# Patient Record
Sex: FEMALE | Race: WHITE | NOT HISPANIC OR LATINO | URBAN - NONMETROPOLITAN AREA
[De-identification: names, ages, dates, MRNs, and addresses within clinical notes are randomized per-mention and may not be internally consistent; named-entity substitution may affect disease eponyms.]

---

## 2017-01-05 ENCOUNTER — AMBULATORY SURGICAL CENTER (OUTPATIENT)
Dept: URBAN - NONMETROPOLITAN AREA SURGERY 2 | Facility: SURGERY | Age: 72
End: 2017-01-05

## 2017-01-05 ENCOUNTER — OFFICE (OUTPATIENT)
Dept: URBAN - NONMETROPOLITAN AREA CLINIC 13 | Facility: CLINIC | Age: 72
End: 2017-01-05

## 2017-01-05 VITALS
SYSTOLIC BLOOD PRESSURE: 150 MMHG | SYSTOLIC BLOOD PRESSURE: 138 MMHG | HEART RATE: 80 BPM | SYSTOLIC BLOOD PRESSURE: 151 MMHG | OXYGEN SATURATION: 99 % | DIASTOLIC BLOOD PRESSURE: 77 MMHG | HEART RATE: 77 BPM | HEART RATE: 85 BPM | OXYGEN SATURATION: 98 % | RESPIRATION RATE: 20 BRPM | OXYGEN SATURATION: 96 % | RESPIRATION RATE: 18 BRPM | DIASTOLIC BLOOD PRESSURE: 85 MMHG | HEART RATE: 79 BPM | HEART RATE: 78 BPM | DIASTOLIC BLOOD PRESSURE: 75 MMHG | WEIGHT: 185 LBS | DIASTOLIC BLOOD PRESSURE: 72 MMHG | HEIGHT: 63 IN | SYSTOLIC BLOOD PRESSURE: 133 MMHG | DIASTOLIC BLOOD PRESSURE: 81 MMHG | HEART RATE: 87 BPM | DIASTOLIC BLOOD PRESSURE: 69 MMHG | SYSTOLIC BLOOD PRESSURE: 145 MMHG | HEART RATE: 86 BPM | SYSTOLIC BLOOD PRESSURE: 134 MMHG

## 2017-01-05 DIAGNOSIS — K31.9 DISEASE OF STOMACH AND DUODENUM, UNSPECIFIED: ICD-10-CM

## 2017-01-05 DIAGNOSIS — K29.70 GASTRITIS, UNSPECIFIED, WITHOUT BLEEDING: ICD-10-CM

## 2017-01-05 DIAGNOSIS — K21.9 GASTRO-ESOPHAGEAL REFLUX DISEASE WITHOUT ESOPHAGITIS: ICD-10-CM

## 2017-01-05 DIAGNOSIS — R13.19 OTHER DYSPHAGIA: ICD-10-CM

## 2017-01-05 DIAGNOSIS — K22.2 ESOPHAGEAL OBSTRUCTION: ICD-10-CM

## 2017-01-05 DIAGNOSIS — R10.13 EPIGASTRIC PAIN: ICD-10-CM

## 2017-01-05 DIAGNOSIS — R11.2 NAUSEA WITH VOMITING, UNSPECIFIED: ICD-10-CM

## 2017-01-05 PROCEDURE — 88312 SPECIAL STAINS GROUP 1: CPT | Mod: TC | Performed by: INTERNAL MEDICINE

## 2017-01-05 PROCEDURE — 00740: CPT | Mod: QS,QZ

## 2017-01-05 PROCEDURE — 88305 TISSUE EXAM BY PATHOLOGIST: CPT | Mod: TC | Performed by: INTERNAL MEDICINE

## 2017-01-05 PROCEDURE — 43248 EGD GUIDE WIRE INSERTION: CPT | Performed by: INTERNAL MEDICINE

## 2017-01-05 PROCEDURE — 43239 EGD BIOPSY SINGLE/MULTIPLE: CPT | Performed by: INTERNAL MEDICINE

## 2017-01-05 PROCEDURE — 00740: CPT | Mod: QZ,QS

## 2017-01-05 RX ORDER — RANITIDINE HYDROCHLORIDE 300 MG/1
TABLET, FILM COATED ORAL
Qty: 180 | Refills: 1 | Status: COMPLETED
End: 2019-01-07

## 2017-01-11 LAB — SURGICAL PROCEDURE: PDF REPORT: (no result)

## 2017-04-05 ENCOUNTER — OFFICE (OUTPATIENT)
Dept: URBAN - NONMETROPOLITAN AREA CLINIC 13 | Facility: CLINIC | Age: 72
End: 2017-04-05

## 2017-04-05 VITALS
DIASTOLIC BLOOD PRESSURE: 78 MMHG | SYSTOLIC BLOOD PRESSURE: 140 MMHG | WEIGHT: 176 LBS | HEIGHT: 63 IN | HEART RATE: 71 BPM

## 2017-04-05 DIAGNOSIS — K21.9 GASTRO-ESOPHAGEAL REFLUX DISEASE WITHOUT ESOPHAGITIS: ICD-10-CM

## 2017-04-05 DIAGNOSIS — K59.00 CONSTIPATION, UNSPECIFIED: ICD-10-CM

## 2017-04-05 DIAGNOSIS — R10.11 RIGHT UPPER QUADRANT PAIN: ICD-10-CM

## 2017-04-05 DIAGNOSIS — R10.13 EPIGASTRIC PAIN: ICD-10-CM

## 2017-04-05 DIAGNOSIS — R10.12 LEFT UPPER QUADRANT PAIN: ICD-10-CM

## 2017-04-05 DIAGNOSIS — K83.8 OTHER SPECIFIED DISEASES OF BILIARY TRACT: ICD-10-CM

## 2017-04-05 DIAGNOSIS — Z86.010 PERSONAL HISTORY OF COLONIC POLYPS: ICD-10-CM

## 2017-04-05 PROCEDURE — 99213 OFFICE O/P EST LOW 20 MIN: CPT

## 2017-10-06 ENCOUNTER — OFFICE (OUTPATIENT)
Dept: URBAN - NONMETROPOLITAN AREA CLINIC 13 | Facility: CLINIC | Age: 72
End: 2017-10-06

## 2017-10-06 VITALS
HEART RATE: 70 BPM | DIASTOLIC BLOOD PRESSURE: 76 MMHG | HEIGHT: 63 IN | WEIGHT: 187 LBS | SYSTOLIC BLOOD PRESSURE: 123 MMHG

## 2017-10-06 DIAGNOSIS — R11.0 NAUSEA: ICD-10-CM

## 2017-10-06 DIAGNOSIS — K21.9 GASTRO-ESOPHAGEAL REFLUX DISEASE WITHOUT ESOPHAGITIS: ICD-10-CM

## 2017-10-06 DIAGNOSIS — R10.13 EPIGASTRIC PAIN: ICD-10-CM

## 2017-10-06 DIAGNOSIS — R10.11 RIGHT UPPER QUADRANT PAIN: ICD-10-CM

## 2017-10-06 DIAGNOSIS — R10.12 LEFT UPPER QUADRANT PAIN: ICD-10-CM

## 2017-10-06 DIAGNOSIS — K59.00 CONSTIPATION, UNSPECIFIED: ICD-10-CM

## 2017-10-06 PROCEDURE — 99213 OFFICE O/P EST LOW 20 MIN: CPT

## 2017-10-06 RX ORDER — RANITIDINE HYDROCHLORIDE 300 MG/1
TABLET, FILM COATED ORAL
Qty: 180 | Refills: 1 | Status: COMPLETED
End: 2019-01-07

## 2017-10-06 RX ORDER — OMEPRAZOLE 20 MG/1
CAPSULE, DELAYED RELEASE ORAL
Qty: 180 | Refills: 1 | Status: ACTIVE

## 2018-04-26 ENCOUNTER — OFFICE (OUTPATIENT)
Dept: URBAN - NONMETROPOLITAN AREA CLINIC 13 | Facility: CLINIC | Age: 73
End: 2018-04-26

## 2018-04-26 VITALS
WEIGHT: 190 LBS | DIASTOLIC BLOOD PRESSURE: 81 MMHG | SYSTOLIC BLOOD PRESSURE: 132 MMHG | HEIGHT: 63 IN | HEART RATE: 77 BPM

## 2018-04-26 VITALS — HEIGHT: 63 IN

## 2018-04-26 DIAGNOSIS — R11.0 NAUSEA: ICD-10-CM

## 2018-04-26 DIAGNOSIS — K22.2 ESOPHAGEAL OBSTRUCTION: ICD-10-CM

## 2018-04-26 DIAGNOSIS — R10.13 EPIGASTRIC PAIN: ICD-10-CM

## 2018-04-26 DIAGNOSIS — K21.9 GASTRO-ESOPHAGEAL REFLUX DISEASE WITHOUT ESOPHAGITIS: ICD-10-CM

## 2018-04-26 DIAGNOSIS — R19.7 DIARRHEA, UNSPECIFIED: ICD-10-CM

## 2018-04-26 LAB
AMYLASE: 49.3 U/L (ref 25–125)
CBC SYSMEX: HEMATOCRIT: 39.1 % (ref 37–47)
CBC SYSMEX: HEMOGLOBIN: 12.6 G/DL (ref 12–14)
CBC SYSMEX: LYMPHS %: 30.6 % (ref 20.5–40.5)
CBC SYSMEX: LYMPHS ABSOLUTE: 2.6 10*3U/L (ref 1.3–2.9)
CBC SYSMEX: MCH: 29.3 PG (ref 27–32)
CBC SYSMEX: MCHC: 32.2 G/DL (ref 28.5–35)
CBC SYSMEX: MCV: 90.9 FL (ref 84–100)
CBC SYSMEX: MONOS %: 11.1 % — HIGH (ref 2–10)
CBC SYSMEX: MONOS ABSOLUTE: 0.9 10*3U/L (ref 0.3–3.8)
CBC SYSMEX: MPV: 9.3 FL (ref 7.4–10.4)
CBC SYSMEX: NEUT. %: 58.3 % (ref 43–65)
CBC SYSMEX: NEUT. ABSOLUTE: 5 10*3U/L — HIGH (ref 2.2–4.8)
CBC SYSMEX: PLATELETS: 314 10*3U/L (ref 130–440)
CBC SYSMEX: RBC: 4.3 10*6U/L (ref 4.2–5.4)
CBC SYSMEX: RDW: 14.1 % (ref 11.5–15.5)
CBC SYSMEX: WBC: 8.5 10*3U/L (ref 4.5–10.5)
COMPLETE METABOLIC: ALBUMIN: 4.3 G/DL (ref 3.5–5.2)
COMPLETE METABOLIC: ALK. PHOS: 116 U/L (ref 40–150)
COMPLETE METABOLIC: ALT: 12 U/L (ref 0–55)
COMPLETE METABOLIC: AST: 17 U/L (ref 5–34)
COMPLETE METABOLIC: BUN: 14 MG/DL (ref 7–26)
COMPLETE METABOLIC: CALCIUM: 9.1 MG/DL (ref 8.4–10.2)
COMPLETE METABOLIC: CHLORIDE: 105 MMOL/L (ref 98–107)
COMPLETE METABOLIC: CO2: 27 MEQ/L (ref 22–29)
COMPLETE METABOLIC: CREATININE: 1.07 MG/DL (ref 0.57–1.25)
COMPLETE METABOLIC: GFR - AFRICAN AMERICAN: 65.3 (ref 59–200)
COMPLETE METABOLIC: GFR - NON AFRICAN AMERICAN: 53.9 — CRITICAL LOW (ref 59–200)
COMPLETE METABOLIC: GLUCOSE: 99 MG/DL (ref 70–99)
COMPLETE METABOLIC: POTASSIUM: 4.3 MMOL/L (ref 3.5–5.3)
COMPLETE METABOLIC: SODIUM: 141 MMOL/L (ref 136–145)
COMPLETE METABOLIC: TOTAL BILIRUBIN: 0.4 MG/DL (ref 0.2–1.2)
COMPLETE METABOLIC: TOTAL PROTEIN: 6.2 G/DL — LOW (ref 6.4–8.3)
LIPASE: 26 U/L (ref 8–78)

## 2018-04-26 PROCEDURE — 99213 OFFICE O/P EST LOW 20 MIN: CPT

## 2018-04-26 PROCEDURE — 36415 COLL VENOUS BLD VENIPUNCTURE: CPT

## 2018-04-26 PROCEDURE — 80053 COMPREHEN METABOLIC PANEL: CPT

## 2018-04-26 PROCEDURE — 85025 COMPLETE CBC W/AUTO DIFF WBC: CPT

## 2018-04-26 PROCEDURE — 82150 ASSAY OF AMYLASE: CPT

## 2018-04-26 PROCEDURE — 83690 ASSAY OF LIPASE: CPT

## 2018-12-04 ENCOUNTER — OFFICE (OUTPATIENT)
Dept: URBAN - NONMETROPOLITAN AREA CLINIC 13 | Facility: CLINIC | Age: 73
End: 2018-12-04

## 2018-12-04 VITALS — HEIGHT: 63 IN

## 2018-12-04 VITALS
SYSTOLIC BLOOD PRESSURE: 135 MMHG | HEART RATE: 80 BPM | OXYGEN SATURATION: 97 % | WEIGHT: 195 LBS | HEIGHT: 63 IN | DIASTOLIC BLOOD PRESSURE: 83 MMHG

## 2018-12-04 DIAGNOSIS — Z86.010 PERSONAL HISTORY OF COLONIC POLYPS: ICD-10-CM

## 2018-12-04 DIAGNOSIS — R10.13 EPIGASTRIC PAIN: ICD-10-CM

## 2018-12-04 DIAGNOSIS — K62.5 HEMORRHAGE OF ANUS AND RECTUM: ICD-10-CM

## 2018-12-04 DIAGNOSIS — Z79.899 OTHER LONG TERM (CURRENT) DRUG THERAPY: ICD-10-CM

## 2018-12-04 DIAGNOSIS — R13.19 OTHER DYSPHAGIA: ICD-10-CM

## 2018-12-04 DIAGNOSIS — K21.9 GASTRO-ESOPHAGEAL REFLUX DISEASE WITHOUT ESOPHAGITIS: ICD-10-CM

## 2018-12-04 LAB
AMYLASE: 52.2 U/L (ref 25–125)
CBC SYSMEX: HEMATOCRIT: 38.3 % (ref 37–47)
CBC SYSMEX: HEMOGLOBIN: 12.3 G/DL (ref 12–14)
CBC SYSMEX: LYMPHS %: 22.5 % (ref 20.5–40.5)
CBC SYSMEX: LYMPHS ABSOLUTE: 1.9 10*3U/L (ref 1.3–2.9)
CBC SYSMEX: MCH: 29 PG (ref 27–32)
CBC SYSMEX: MCHC: 32.1 G/DL (ref 28.5–35)
CBC SYSMEX: MCV: 90.3 FL (ref 84–100)
CBC SYSMEX: MONOS %: 11.2 % — HIGH (ref 2–10)
CBC SYSMEX: MONOS ABSOLUTE: 1 10*3U/L (ref 0.3–3.8)
CBC SYSMEX: MPV: 9.2 FL (ref 8.3–11.9)
CBC SYSMEX: NEUT. %: 66.3 % (ref 43–67)
CBC SYSMEX: NEUT. ABSOLUTE: 5.7 10*3U/L — HIGH (ref 2.2–4.8)
CBC SYSMEX: PLATELETS: 315 10*3U/L (ref 130–440)
CBC SYSMEX: RBC: 4.2 10*6U/L (ref 4.2–5.4)
CBC SYSMEX: RDW: 14.3 % (ref 11.5–15.5)
CBC SYSMEX: WBC: 8.6 10*3U/L (ref 4.5–10.5)
COMPLETE METABOLIC: ALBUMIN: 4.4 G/DL (ref 3.5–5.2)
COMPLETE METABOLIC: ALK. PHOS: 128 U/L (ref 40–150)
COMPLETE METABOLIC: ALT: 14 U/L (ref 0–55)
COMPLETE METABOLIC: AST: 20 U/L (ref 5–34)
COMPLETE METABOLIC: BUN: 14 MG/DL (ref 7–26)
COMPLETE METABOLIC: CALCIUM: 9.6 MG/DL (ref 8.4–10.3)
COMPLETE METABOLIC: CHLORIDE: 104 MMOL/L (ref 98–107)
COMPLETE METABOLIC: CO2: 29 MEQ/L (ref 22–29)
COMPLETE METABOLIC: CREATININE: 1.14 MG/DL (ref 0.57–1.25)
COMPLETE METABOLIC: GFR - AFRICAN AMERICAN: 60.7 (ref 59–200)
COMPLETE METABOLIC: GFR - NON AFRICAN AMERICAN: 50.1 — CRITICAL LOW (ref 59–200)
COMPLETE METABOLIC: GLUCOSE: 95 MG/DL (ref 70–99)
COMPLETE METABOLIC: POTASSIUM: 4.1 MMOL/L (ref 3.5–5.3)
COMPLETE METABOLIC: SODIUM: 141 MMOL/L (ref 136–145)
COMPLETE METABOLIC: TOTAL BILIRUBIN: 0.5 MG/DL (ref 0.2–1.2)
COMPLETE METABOLIC: TOTAL PROTEIN: 6.8 G/DL (ref 6.4–8.3)
LIPASE: 28 U/L (ref 8–78)

## 2018-12-04 PROCEDURE — 80053 COMPREHEN METABOLIC PANEL: CPT

## 2018-12-04 PROCEDURE — 76700 US EXAM ABDOM COMPLETE: CPT | Mod: TC

## 2018-12-04 PROCEDURE — 36415 COLL VENOUS BLD VENIPUNCTURE: CPT | Performed by: INTERNAL MEDICINE

## 2018-12-04 PROCEDURE — 82150 ASSAY OF AMYLASE: CPT

## 2018-12-04 PROCEDURE — 85025 COMPLETE CBC W/AUTO DIFF WBC: CPT

## 2018-12-04 PROCEDURE — 99214 OFFICE O/P EST MOD 30 MIN: CPT | Mod: 25

## 2018-12-04 PROCEDURE — 83690 ASSAY OF LIPASE: CPT

## 2019-01-07 ENCOUNTER — OFFICE (OUTPATIENT)
Dept: URBAN - NONMETROPOLITAN AREA CLINIC 13 | Facility: CLINIC | Age: 74
End: 2019-01-07

## 2019-01-07 ENCOUNTER — AMBULATORY SURGICAL CENTER (OUTPATIENT)
Dept: URBAN - NONMETROPOLITAN AREA SURGERY 2 | Facility: SURGERY | Age: 74
End: 2019-01-07

## 2019-01-07 VITALS
SYSTOLIC BLOOD PRESSURE: 158 MMHG | DIASTOLIC BLOOD PRESSURE: 79 MMHG | DIASTOLIC BLOOD PRESSURE: 96 MMHG | HEART RATE: 72 BPM | SYSTOLIC BLOOD PRESSURE: 142 MMHG | SYSTOLIC BLOOD PRESSURE: 121 MMHG | OXYGEN SATURATION: 98 % | HEART RATE: 67 BPM | HEART RATE: 77 BPM | DIASTOLIC BLOOD PRESSURE: 95 MMHG | SYSTOLIC BLOOD PRESSURE: 137 MMHG | HEART RATE: 78 BPM | DIASTOLIC BLOOD PRESSURE: 77 MMHG | OXYGEN SATURATION: 99 % | HEIGHT: 63 IN | OXYGEN SATURATION: 97 % | SYSTOLIC BLOOD PRESSURE: 145 MMHG | HEART RATE: 81 BPM | DIASTOLIC BLOOD PRESSURE: 74 MMHG | DIASTOLIC BLOOD PRESSURE: 64 MMHG | SYSTOLIC BLOOD PRESSURE: 149 MMHG | OXYGEN SATURATION: 96 % | DIASTOLIC BLOOD PRESSURE: 78 MMHG | RESPIRATION RATE: 18 BRPM | SYSTOLIC BLOOD PRESSURE: 148 MMHG | WEIGHT: 195 LBS | OXYGEN SATURATION: 87 % | RESPIRATION RATE: 20 BRPM | HEART RATE: 75 BPM | HEART RATE: 73 BPM

## 2019-01-07 DIAGNOSIS — K22.2 ESOPHAGEAL OBSTRUCTION: ICD-10-CM

## 2019-01-07 DIAGNOSIS — R13.10 DYSPHAGIA, UNSPECIFIED: ICD-10-CM

## 2019-01-07 DIAGNOSIS — K31.89 OTHER DISEASES OF STOMACH AND DUODENUM: ICD-10-CM

## 2019-01-07 DIAGNOSIS — K29.70 GASTRITIS, UNSPECIFIED, WITHOUT BLEEDING: ICD-10-CM

## 2019-01-07 DIAGNOSIS — K21.9 GASTRO-ESOPHAGEAL REFLUX DISEASE WITHOUT ESOPHAGITIS: ICD-10-CM

## 2019-01-07 PROBLEM — Z79.899 LONG-TERM (CURRENT) USE OF OTHER MEDICATIONS (PPI): Status: ACTIVE | Noted: 2019-01-07

## 2019-01-07 PROCEDURE — 00731 ANES UPR GI NDSC PX NOS: CPT | Mod: QS,QZ

## 2019-01-07 PROCEDURE — 43239 EGD BIOPSY SINGLE/MULTIPLE: CPT | Mod: 59 | Performed by: INTERNAL MEDICINE

## 2019-01-07 PROCEDURE — 88305 TISSUE EXAM BY PATHOLOGIST: CPT | Mod: TC | Performed by: INTERNAL MEDICINE

## 2019-01-07 PROCEDURE — 43248 EGD GUIDE WIRE INSERTION: CPT | Performed by: INTERNAL MEDICINE

## 2019-01-07 RX ORDER — FAMOTIDINE 40 MG/1
TABLET, FILM COATED ORAL
Qty: 180 | Refills: 1 | Status: ACTIVE
Start: 2019-01-07

## 2019-01-07 RX ORDER — RANITIDINE HYDROCHLORIDE 300 MG/1
TABLET, FILM COATED ORAL
Qty: 180 | Refills: 1 | Status: COMPLETED
End: 2019-01-07

## 2019-01-12 LAB — SURGICAL PROCEDURE: PDFREPORT1: (no result)

## 2019-11-05 ENCOUNTER — OFFICE (OUTPATIENT)
Dept: URBAN - NONMETROPOLITAN AREA CLINIC 13 | Facility: CLINIC | Age: 74
End: 2019-11-05

## 2019-11-05 VITALS
SYSTOLIC BLOOD PRESSURE: 116 MMHG | DIASTOLIC BLOOD PRESSURE: 74 MMHG | HEIGHT: 63 IN | HEART RATE: 70 BPM | OXYGEN SATURATION: 94 % | WEIGHT: 171 LBS

## 2019-11-05 DIAGNOSIS — R10.13 EPIGASTRIC PAIN: ICD-10-CM

## 2019-11-05 DIAGNOSIS — K21.9 GASTRO-ESOPHAGEAL REFLUX DISEASE WITHOUT ESOPHAGITIS: ICD-10-CM

## 2019-11-05 DIAGNOSIS — Z86.010 PERSONAL HISTORY OF COLONIC POLYPS: ICD-10-CM

## 2019-11-05 PROCEDURE — 99213 OFFICE O/P EST LOW 20 MIN: CPT

## 2020-03-12 ENCOUNTER — OFFICE (OUTPATIENT)
Dept: URBAN - NONMETROPOLITAN AREA CLINIC 13 | Facility: CLINIC | Age: 75
End: 2020-03-12

## 2020-03-12 VITALS
HEART RATE: 58 BPM | OXYGEN SATURATION: 95 % | DIASTOLIC BLOOD PRESSURE: 80 MMHG | SYSTOLIC BLOOD PRESSURE: 128 MMHG | HEIGHT: 63 IN | WEIGHT: 163 LBS

## 2020-03-12 VITALS — HEIGHT: 63 IN

## 2020-03-12 DIAGNOSIS — K76.89 OTHER SPECIFIED DISEASES OF LIVER: ICD-10-CM

## 2020-03-12 DIAGNOSIS — R10.13 EPIGASTRIC PAIN: ICD-10-CM

## 2020-03-12 DIAGNOSIS — Z86.010 PERSONAL HISTORY OF COLONIC POLYPS: ICD-10-CM

## 2020-03-12 DIAGNOSIS — N28.1 CYST OF KIDNEY, ACQUIRED: ICD-10-CM

## 2020-03-12 DIAGNOSIS — K21.9 GASTRO-ESOPHAGEAL REFLUX DISEASE WITHOUT ESOPHAGITIS: ICD-10-CM

## 2020-03-12 DIAGNOSIS — R10.30 LOWER ABDOMINAL PAIN, UNSPECIFIED: ICD-10-CM

## 2020-03-12 DIAGNOSIS — K22.2 ESOPHAGEAL OBSTRUCTION: ICD-10-CM

## 2020-03-12 PROCEDURE — 99214 OFFICE O/P EST MOD 30 MIN: CPT | Mod: 25

## 2020-03-12 PROCEDURE — 76700 US EXAM ABDOM COMPLETE: CPT | Mod: TC

## 2020-03-12 PROCEDURE — 76856 US EXAM PELVIC COMPLETE: CPT | Mod: TC

## 2020-03-12 RX ORDER — OMEPRAZOLE 20 MG/1
CAPSULE, DELAYED RELEASE ORAL
Qty: 180 | Refills: 1 | Status: ACTIVE

## 2020-03-12 RX ORDER — BACLOFEN 10 MG/1
TABLET ORAL
Qty: 45 | Refills: 5 | Status: ACTIVE
Start: 2019-01-18

## 2020-03-12 RX ORDER — FAMOTIDINE 40 MG/1
TABLET, FILM COATED ORAL
Qty: 180 | Refills: 1 | Status: ACTIVE
Start: 2019-01-07

## 2020-03-13 LAB
AMYLASE: 42 UNITS/L (ref 30–110)
CBC WITH WBC (DIFF): ACANTHOCYTE: NORMAL
CBC WITH WBC (DIFF): AGRANULAR NEUTROPHIL: NORMAL
CBC WITH WBC (DIFF): ANISOCYTOSIS: NORMAL
CBC WITH WBC (DIFF): ATYPICAL LYMPHOCYTE: NORMAL
CBC WITH WBC (DIFF): AUER RODS: NORMAL
CBC WITH WBC (DIFF): BAND: NORMAL
CBC WITH WBC (DIFF): BASOPHIL: 1 % (ref 0–1)
CBC WITH WBC (DIFF): BASOPHILIC STIPPLING: NORMAL
CBC WITH WBC (DIFF): BI-LOBED NEUTROPHIL: NORMAL
CBC WITH WBC (DIFF): BLAST: NORMAL
CBC WITH WBC (DIFF): BURR CELL: NORMAL
CBC WITH WBC (DIFF): DIMORPHIC RBC POPULATION: NORMAL
CBC WITH WBC (DIFF): DOHLE BODIES: NORMAL
CBC WITH WBC (DIFF): ELLIPTOCYTE: NORMAL
CBC WITH WBC (DIFF): EOSINOPHIL: 3 % (ref 0–5)
CBC WITH WBC (DIFF): GIANT PLATELET: NORMAL
CBC WITH WBC (DIFF): HEMATOCRIT: 43.9 % (ref 36–46)
CBC WITH WBC (DIFF): HEMOGLOBIN: 13.1 G/DL (ref 12–16)
CBC WITH WBC (DIFF): HOWELL JOLLY BODIES: NORMAL
CBC WITH WBC (DIFF): HYPERSEGMENTED NEUTROPHIL: NORMAL
CBC WITH WBC (DIFF): HYPOCHROMIA: NORMAL
CBC WITH WBC (DIFF): HYPOGRANULAR NEUTROPHIL: NORMAL
CBC WITH WBC (DIFF): IMMATURE GRANULOCYTES: 0 % (ref 0–1)
CBC WITH WBC (DIFF): LARGE PLATELET: NORMAL
CBC WITH WBC (DIFF): LYMPHOCYTE: 27 % (ref 20–40)
CBC WITH WBC (DIFF): MACROCYTOSIS: NORMAL
CBC WITH WBC (DIFF): MEAN CELL HEMOGLOBIN CONCENTRATION: 29.8 G/DL — LOW (ref 33–37)
CBC WITH WBC (DIFF): MEAN CELL HEMOGLOBIN: 28.4 PG (ref 27–31)
CBC WITH WBC (DIFF): MEAN CELL VOLUME: 95 FL (ref 84–100)
CBC WITH WBC (DIFF): MEAN PLATELET VOLUME: 10.1 FL (ref 8.3–11.9)
CBC WITH WBC (DIFF): METAMYELOCYTE: NORMAL
CBC WITH WBC (DIFF): MICROCYTOSIS: NORMAL
CBC WITH WBC (DIFF): MIX CELLS: NORMAL
CBC WITH WBC (DIFF): MONOCYTE: 9 % (ref 1–10)
CBC WITH WBC (DIFF): MYELOCYTE: NORMAL
CBC WITH WBC (DIFF): NEUTROPHIL SEGMENTED: 61 % (ref 50–70)
CBC WITH WBC (DIFF): NOTE: NORMAL
CBC WITH WBC (DIFF): NUCLEATED RBC: 0 /100WBC (ref 0–0)
CBC WITH WBC (DIFF): OVALOCYTE: NORMAL
CBC WITH WBC (DIFF): PAPPENHEIMER BODIES: NORMAL
CBC WITH WBC (DIFF): PATHOLOGIST INTERPRETATION: NORMAL
CBC WITH WBC (DIFF): PLATELET CLUMPS: NORMAL
CBC WITH WBC (DIFF): PLATELET COUNT: 349 /NL (ref 140–440)
CBC WITH WBC (DIFF): PLT SATELLITOSIS: NORMAL
CBC WITH WBC (DIFF): POIKILOCYTOSIS: NORMAL
CBC WITH WBC (DIFF): POLYCHROMASIA: NORMAL
CBC WITH WBC (DIFF): PROMYELOCYTE: NORMAL
CBC WITH WBC (DIFF): RBC MORPHOLOGY: NORMAL
CBC WITH WBC (DIFF): REACT LYMPH ABS MAN: NORMAL
CBC WITH WBC (DIFF): REACTIVE LYMPHOCYTE: NORMAL
CBC WITH WBC (DIFF): RED BLOOD CELL: 4.62 /PL (ref 4.2–5.4)
CBC WITH WBC (DIFF): RED CELL DIAMETER WIDTH: 52 FL — HIGH (ref 35–48)
CBC WITH WBC (DIFF): ROULEAUX RBC: NORMAL
CBC WITH WBC (DIFF): SCHISTOCYTE: NORMAL
CBC WITH WBC (DIFF): SICKLE CELL: NORMAL
CBC WITH WBC (DIFF): SMUDGE CELLS: NORMAL
CBC WITH WBC (DIFF): SPHEROCYTE: NORMAL
CBC WITH WBC (DIFF): STOMATOCYTE: NORMAL
CBC WITH WBC (DIFF): TARGET CELL: NORMAL
CBC WITH WBC (DIFF): TEAR DROP CELL: NORMAL
CBC WITH WBC (DIFF): TOXIC GRANULATION: NORMAL
CBC WITH WBC (DIFF): UNCLASSIFIED CELL: NORMAL
CBC WITH WBC (DIFF): VACUOLATED NEUTROPHIL: NORMAL
CBC WITH WBC (DIFF): WBC MORPHOLOGY: NORMAL
CBC WITH WBC (DIFF): WHITE BLOOD CELL: 9.4 /NL (ref 4.8–11)
COMPREHENSIVE METABOLIC PANEL: ALBUMIN: 4.3 G/DL (ref 3.5–4.8)
COMPREHENSIVE METABOLIC PANEL: ALKALINE PHOSPHATASE: 84 UNITS/L (ref 36–126)
COMPREHENSIVE METABOLIC PANEL: ALT: 10 UNITS/L (ref ?–34)
COMPREHENSIVE METABOLIC PANEL: ANION GAP: 13 MMOL/L (ref 7–16)
COMPREHENSIVE METABOLIC PANEL: AST: 20 UNITS/L (ref 14–36)
COMPREHENSIVE METABOLIC PANEL: BILIRUBIN, TOTAL: 0.5 MG/DL (ref 0.2–1.3)
COMPREHENSIVE METABOLIC PANEL: BUN/CREATININE RATIO: 14.7
COMPREHENSIVE METABOLIC PANEL: CALCIUM: 9.7 MG/DL (ref 8.4–10.2)
COMPREHENSIVE METABOLIC PANEL: CARBON DIOXIDE: 24 MMOL/L (ref 22–30)
COMPREHENSIVE METABOLIC PANEL: CHLORIDE: 103 MMOL/L (ref 98–107)
COMPREHENSIVE METABOLIC PANEL: CREATININE: 1.02 MG/DL (ref 0.52–1.04)
COMPREHENSIVE METABOLIC PANEL: GLUCOSE: 89 MG/DL (ref 65–105)
COMPREHENSIVE METABOLIC PANEL: POTASSIUM: 4.3 MMOL/L (ref 3.5–5.3)
COMPREHENSIVE METABOLIC PANEL: PROTEIN, TOTAL, SERUM: 7.1 G/DL (ref 6.3–8.2)
COMPREHENSIVE METABOLIC PANEL: SODIUM: 140 MMOL/L (ref 137–145)
COMPREHENSIVE METABOLIC PANEL: UREA NITROGEN (BUN): 15 MG/DL (ref 7–17)
GFR: EGFR AFRICAN AMERICAN: >60 ML/MIN/1.73M2
GFR: EGFR NON-AFR.AMERICAN: 53 ML/MIN/1.73M2 — LOW (ref 60–?)
LIPASE: 47 UNITS/L (ref 23–300)

## 2021-01-01 ENCOUNTER — APPOINTMENT (OUTPATIENT)
Dept: GENERAL RADIOLOGY | Facility: HOSPITAL | Age: 76
End: 2021-01-01

## 2021-01-01 ENCOUNTER — HOSPITAL ENCOUNTER (INPATIENT)
Facility: HOSPITAL | Age: 76
End: 2021-01-01
Attending: INTERNAL MEDICINE | Admitting: INTERNAL MEDICINE

## 2021-01-01 ENCOUNTER — HOSPITAL ENCOUNTER (OUTPATIENT)
Facility: HOSPITAL | Age: 76
End: 2021-07-06
Attending: INTERNAL MEDICINE | Admitting: INTERNAL MEDICINE

## 2021-01-01 VITALS — HEIGHT: 63 IN | WEIGHT: 182.3 LBS | BODY MASS INDEX: 32.3 KG/M2

## 2021-01-01 LAB
ALBUMIN SERPL-MCNC: 2.5 G/DL (ref 3.5–5.2)
ALBUMIN/GLOB SERPL: 0.8 G/DL
ALP SERPL-CCNC: 113 U/L (ref 39–117)
ALT SERPL W P-5'-P-CCNC: 21 U/L (ref 1–33)
ANION GAP SERPL CALCULATED.3IONS-SCNC: 10 MMOL/L (ref 5–15)
ANION GAP SERPL CALCULATED.3IONS-SCNC: 6 MMOL/L (ref 5–15)
ANISOCYTOSIS BLD QL: ABNORMAL
AST SERPL-CCNC: 21 U/L (ref 1–32)
BASO STIPL COARSE BLD QL SMEAR: ABNORMAL
BASOPHILS # BLD MANUAL: 0.19 10*3/MM3 (ref 0–0.2)
BASOPHILS NFR BLD AUTO: 1 % (ref 0–1.5)
BILIRUB SERPL-MCNC: 0.7 MG/DL (ref 0–1.2)
BUN SERPL-MCNC: 26 MG/DL (ref 8–23)
BUN SERPL-MCNC: 28 MG/DL (ref 8–23)
BUN/CREAT SERPL: 36.6 (ref 7–25)
BUN/CREAT SERPL: 50.9 (ref 7–25)
CALCIUM SPEC-SCNC: 8.8 MG/DL (ref 8.6–10.5)
CALCIUM SPEC-SCNC: 9 MG/DL (ref 8.6–10.5)
CHLORIDE SERPL-SCNC: 95 MMOL/L (ref 98–107)
CHLORIDE SERPL-SCNC: 97 MMOL/L (ref 98–107)
CLUMPED PLATELETS: PRESENT
CO2 SERPL-SCNC: 31 MMOL/L (ref 22–29)
CO2 SERPL-SCNC: 32 MMOL/L (ref 22–29)
CREAT SERPL-MCNC: 0.55 MG/DL (ref 0.57–1)
CREAT SERPL-MCNC: 0.71 MG/DL (ref 0.57–1)
DEPRECATED RDW RBC AUTO: 56.1 FL (ref 37–54)
DEPRECATED RDW RBC AUTO: 59.8 FL (ref 37–54)
ELLIPTOCYTES BLD QL SMEAR: ABNORMAL
EOSINOPHIL # BLD MANUAL: 0.46 10*3/MM3 (ref 0–0.4)
EOSINOPHIL # BLD MANUAL: 0.58 10*3/MM3 (ref 0–0.4)
EOSINOPHIL NFR BLD MANUAL: 3 % (ref 0.3–6.2)
EOSINOPHIL NFR BLD MANUAL: 4 % (ref 0.3–6.2)
ERYTHROCYTE [DISTWIDTH] IN BLOOD BY AUTOMATED COUNT: 18.5 % (ref 12.3–15.4)
ERYTHROCYTE [DISTWIDTH] IN BLOOD BY AUTOMATED COUNT: 18.8 % (ref 12.3–15.4)
GFR SERPL CREATININE-BSD FRML MDRD: 108 ML/MIN/1.73
GFR SERPL CREATININE-BSD FRML MDRD: 81 ML/MIN/1.73
GLOBULIN UR ELPH-MCNC: 3 GM/DL
GLUCOSE SERPL-MCNC: 103 MG/DL (ref 65–99)
GLUCOSE SERPL-MCNC: 114 MG/DL (ref 65–99)
HCT VFR BLD AUTO: 30 % (ref 34–46.6)
HCT VFR BLD AUTO: 30.8 % (ref 34–46.6)
HGB BLD-MCNC: 9.9 G/DL (ref 12–15.9)
HGB BLD-MCNC: 9.9 G/DL (ref 12–15.9)
LYMPHOCYTES # BLD MANUAL: 1.26 10*3/MM3 (ref 0.7–3.1)
LYMPHOCYTES # BLD MANUAL: 1.71 10*3/MM3 (ref 0.7–3.1)
LYMPHOCYTES NFR BLD MANUAL: 11.1 % (ref 19.6–45.3)
LYMPHOCYTES NFR BLD MANUAL: 6.1 % (ref 5–12)
LYMPHOCYTES NFR BLD MANUAL: 7.9 % (ref 5–12)
LYMPHOCYTES NFR BLD MANUAL: 8.9 % (ref 19.6–45.3)
MCH RBC QN AUTO: 28.7 PG (ref 26.6–33)
MCH RBC QN AUTO: 28.9 PG (ref 26.6–33)
MCHC RBC AUTO-ENTMCNC: 32.1 G/DL (ref 31.5–35.7)
MCHC RBC AUTO-ENTMCNC: 33 G/DL (ref 31.5–35.7)
MCV RBC AUTO: 87 FL (ref 79–97)
MCV RBC AUTO: 90.1 FL (ref 79–97)
MONOCYTES # BLD AUTO: 0.69 10*3/MM3 (ref 0.1–0.9)
MONOCYTES # BLD AUTO: 1.52 10*3/MM3 (ref 0.1–0.9)
MYELOCYTES NFR BLD MANUAL: 1 % (ref 0–0)
NEUTROPHILS # BLD AUTO: 15.19 10*3/MM3 (ref 1.7–7)
NEUTROPHILS # BLD AUTO: 8.85 10*3/MM3 (ref 1.7–7)
NEUTROPHILS NFR BLD MANUAL: 77.8 % (ref 42.7–76)
NEUTROPHILS NFR BLD MANUAL: 78.2 % (ref 42.7–76)
NEUTS BAND NFR BLD MANUAL: 1 % (ref 0–5)
NRBC BLD AUTO-RTO: 0.5 /100 WBC (ref 0–0.2)
NRBC SPEC MANUAL: 1 /100 WBC (ref 0–0.2)
NRBC SPEC MANUAL: 2 /100 WBC (ref 0–0.2)
NT-PROBNP SERPL-MCNC: 378.7 PG/ML (ref 0–900)
PLAT MORPH BLD: NORMAL
PLATELET # BLD AUTO: 391 10*3/MM3 (ref 140–450)
PLATELET # BLD AUTO: 665 10*3/MM3 (ref 140–450)
PMV BLD AUTO: 10.1 FL (ref 6–12)
PMV BLD AUTO: 10.6 FL (ref 6–12)
POIKILOCYTOSIS BLD QL SMEAR: ABNORMAL
POIKILOCYTOSIS BLD QL SMEAR: ABNORMAL
POLYCHROMASIA BLD QL SMEAR: ABNORMAL
POLYCHROMASIA BLD QL SMEAR: ABNORMAL
POTASSIUM SERPL-SCNC: 3.6 MMOL/L (ref 3.5–5.2)
POTASSIUM SERPL-SCNC: 4.4 MMOL/L (ref 3.5–5.2)
PREALB SERPL-MCNC: 17.1 MG/DL (ref 20–40)
PROT SERPL-MCNC: 5.5 G/DL (ref 6–8.5)
RBC # BLD AUTO: 3.42 10*6/MM3 (ref 3.77–5.28)
RBC # BLD AUTO: 3.45 10*6/MM3 (ref 3.77–5.28)
SMALL PLATELETS BLD QL SMEAR: ABNORMAL
SODIUM SERPL-SCNC: 133 MMOL/L (ref 136–145)
SODIUM SERPL-SCNC: 138 MMOL/L (ref 136–145)
WBC # BLD AUTO: 11.38 10*3/MM3 (ref 3.4–10.8)
WBC # BLD AUTO: 19.18 10*3/MM3 (ref 3.4–10.8)
WBC MORPH BLD: NORMAL
WBC MORPH BLD: NORMAL

## 2021-01-01 PROCEDURE — 63710000001 PREDNISONE PER 1 MG: Performed by: INTERNAL MEDICINE

## 2021-01-01 PROCEDURE — 85007 BL SMEAR W/DIFF WBC COUNT: CPT | Performed by: INTERNAL MEDICINE

## 2021-01-01 PROCEDURE — 71045 X-RAY EXAM CHEST 1 VIEW: CPT

## 2021-01-01 PROCEDURE — 80048 BASIC METABOLIC PNL TOTAL CA: CPT | Performed by: INTERNAL MEDICINE

## 2021-01-01 PROCEDURE — 93010 ELECTROCARDIOGRAM REPORT: CPT | Performed by: INTERNAL MEDICINE

## 2021-01-01 PROCEDURE — 25010000002 FUROSEMIDE PER 20 MG: Performed by: INTERNAL MEDICINE

## 2021-01-01 PROCEDURE — 97530 THERAPEUTIC ACTIVITIES: CPT | Performed by: OCCUPATIONAL THERAPIST

## 2021-01-01 PROCEDURE — 80053 COMPREHEN METABOLIC PANEL: CPT | Performed by: INTERNAL MEDICINE

## 2021-01-01 PROCEDURE — 97165 OT EVAL LOW COMPLEX 30 MIN: CPT | Performed by: OCCUPATIONAL THERAPIST

## 2021-01-01 PROCEDURE — 92526 ORAL FUNCTION THERAPY: CPT

## 2021-01-01 PROCEDURE — 25010000002 EPINEPHRINE 1 MG/10ML SOLUTION PREFILLED SYRINGE

## 2021-01-01 PROCEDURE — 97116 GAIT TRAINING THERAPY: CPT

## 2021-01-01 PROCEDURE — 97110 THERAPEUTIC EXERCISES: CPT

## 2021-01-01 PROCEDURE — 97530 THERAPEUTIC ACTIVITIES: CPT

## 2021-01-01 PROCEDURE — 83880 ASSAY OF NATRIURETIC PEPTIDE: CPT | Performed by: INTERNAL MEDICINE

## 2021-01-01 PROCEDURE — 25010000002 EPINEPHRINE 1 MG/ML SOLUTION 30 ML VIAL: Performed by: INTERNAL MEDICINE

## 2021-01-01 PROCEDURE — 25010000002 FONDAPARINUX PER 0.5 MG: Performed by: INTERNAL MEDICINE

## 2021-01-01 PROCEDURE — 63710000001 PREDNISONE PER 5 MG: Performed by: INTERNAL MEDICINE

## 2021-01-01 PROCEDURE — 93005 ELECTROCARDIOGRAM TRACING: CPT | Performed by: EMERGENCY MEDICINE

## 2021-01-01 PROCEDURE — 99223 1ST HOSP IP/OBS HIGH 75: CPT | Performed by: INTERNAL MEDICINE

## 2021-01-01 PROCEDURE — 85025 COMPLETE CBC W/AUTO DIFF WBC: CPT | Performed by: INTERNAL MEDICINE

## 2021-01-01 PROCEDURE — 25010000003 ATROPINE SULFATE

## 2021-01-01 PROCEDURE — 97161 PT EVAL LOW COMPLEX 20 MIN: CPT | Performed by: PHYSICAL THERAPIST

## 2021-01-01 PROCEDURE — 84134 ASSAY OF PREALBUMIN: CPT | Performed by: INTERNAL MEDICINE

## 2021-01-01 PROCEDURE — 92610 EVALUATE SWALLOWING FUNCTION: CPT

## 2021-01-01 RX ORDER — MORPHINE SULFATE 2 MG/ML
1 INJECTION, SOLUTION INTRAMUSCULAR; INTRAVENOUS EVERY 4 HOURS PRN
Status: DISCONTINUED | OUTPATIENT
Start: 2021-01-01 | End: 2021-07-07 | Stop reason: HOSPADM

## 2021-01-01 RX ORDER — DIAPER,BRIEF,INFANT-TODD,DISP
EACH MISCELLANEOUS DAILY
Status: DISCONTINUED | OUTPATIENT
Start: 2021-01-01 | End: 2021-07-07 | Stop reason: HOSPADM

## 2021-01-01 RX ORDER — ONDANSETRON 2 MG/ML
4 INJECTION INTRAMUSCULAR; INTRAVENOUS EVERY 6 HOURS PRN
Status: DISCONTINUED | OUTPATIENT
Start: 2021-01-01 | End: 2021-01-01

## 2021-01-01 RX ORDER — BISACODYL 10 MG
10 SUPPOSITORY, RECTAL RECTAL DAILY PRN
Status: DISCONTINUED | OUTPATIENT
Start: 2021-01-01 | End: 2021-07-07 | Stop reason: HOSPADM

## 2021-01-01 RX ORDER — ASPIRIN 81 MG/1
81 TABLET, CHEWABLE ORAL DAILY
Status: DISCONTINUED | OUTPATIENT
Start: 2021-01-01 | End: 2021-07-07 | Stop reason: HOSPADM

## 2021-01-01 RX ORDER — SODIUM CHLORIDE 0.9 % (FLUSH) 0.9 %
10 SYRINGE (ML) INJECTION AS NEEDED
Status: DISCONTINUED | OUTPATIENT
Start: 2021-01-01 | End: 2021-07-07 | Stop reason: HOSPADM

## 2021-01-01 RX ORDER — DULOXETIN HYDROCHLORIDE 30 MG/1
30 CAPSULE, DELAYED RELEASE ORAL DAILY
Status: DISCONTINUED | OUTPATIENT
Start: 2021-01-01 | End: 2021-07-07 | Stop reason: HOSPADM

## 2021-01-01 RX ORDER — FUROSEMIDE 10 MG/ML
20 INJECTION INTRAMUSCULAR; INTRAVENOUS
Status: DISCONTINUED | OUTPATIENT
Start: 2021-01-01 | End: 2021-01-01

## 2021-01-01 RX ORDER — PREDNISONE 1 MG/1
5 TABLET ORAL
Status: DISCONTINUED | OUTPATIENT
Start: 2021-07-07 | End: 2021-07-07 | Stop reason: HOSPADM

## 2021-01-01 RX ORDER — FOLIC ACID/VIT B COMPLEX AND C 0.8 MG
1 TABLET ORAL DAILY
Status: DISCONTINUED | OUTPATIENT
Start: 2021-01-01 | End: 2021-07-07 | Stop reason: HOSPADM

## 2021-01-01 RX ORDER — POLYETHYLENE GLYCOL 3350 17 G/17G
17 POWDER, FOR SOLUTION ORAL DAILY
Status: DISCONTINUED | OUTPATIENT
Start: 2021-01-01 | End: 2021-01-01

## 2021-01-01 RX ORDER — DILTIAZEM HYDROCHLORIDE 180 MG/1
180 CAPSULE, COATED, EXTENDED RELEASE ORAL NIGHTLY
Status: DISCONTINUED | OUTPATIENT
Start: 2021-01-01 | End: 2021-07-07 | Stop reason: HOSPADM

## 2021-01-01 RX ORDER — PANTOPRAZOLE SODIUM 20 MG/1
20 TABLET, DELAYED RELEASE ORAL
Status: DISCONTINUED | OUTPATIENT
Start: 2021-01-01 | End: 2021-01-01

## 2021-01-01 RX ORDER — FONDAPARINUX SODIUM 2.5 MG/.5ML
2.5 INJECTION SUBCUTANEOUS
Status: CANCELLED | OUTPATIENT
Start: 2021-01-01

## 2021-01-01 RX ORDER — ACETAMINOPHEN 325 MG/1
650 TABLET ORAL EVERY 6 HOURS PRN
Status: DISCONTINUED | OUTPATIENT
Start: 2021-01-01 | End: 2021-07-07 | Stop reason: HOSPADM

## 2021-01-01 RX ORDER — PANTOPRAZOLE SODIUM 20 MG/1
20 TABLET, DELAYED RELEASE ORAL
Status: DISCONTINUED | OUTPATIENT
Start: 2021-01-01 | End: 2021-07-07 | Stop reason: HOSPADM

## 2021-01-01 RX ORDER — GUAIFENESIN 600 MG/1
600 TABLET, EXTENDED RELEASE ORAL EVERY 12 HOURS SCHEDULED
Status: DISCONTINUED | OUTPATIENT
Start: 2021-01-01 | End: 2021-07-07 | Stop reason: HOSPADM

## 2021-01-01 RX ORDER — VITAMIN E 268 MG
400 CAPSULE ORAL DAILY
Status: DISCONTINUED | OUTPATIENT
Start: 2021-01-01 | End: 2021-07-07 | Stop reason: HOSPADM

## 2021-01-01 RX ORDER — PREDNISONE 20 MG/1
20 TABLET ORAL
Status: DISPENSED | OUTPATIENT
Start: 2021-01-01 | End: 2021-01-01

## 2021-01-01 RX ORDER — FUROSEMIDE 20 MG/1
20 TABLET ORAL
Status: DISCONTINUED | OUTPATIENT
Start: 2021-01-01 | End: 2021-01-01

## 2021-01-01 RX ORDER — FUROSEMIDE 10 MG/ML
20 INJECTION INTRAMUSCULAR; INTRAVENOUS
Status: DISCONTINUED | OUTPATIENT
Start: 2021-01-01 | End: 2021-07-07 | Stop reason: HOSPADM

## 2021-01-01 RX ORDER — ALPRAZOLAM 0.25 MG/1
0.25 TABLET ORAL 2 TIMES DAILY PRN
Status: DISCONTINUED | OUTPATIENT
Start: 2021-01-01 | End: 2021-07-07 | Stop reason: HOSPADM

## 2021-01-01 RX ORDER — SACCHAROMYCES BOULARDII 250 MG
250 CAPSULE ORAL 2 TIMES DAILY
Status: DISCONTINUED | OUTPATIENT
Start: 2021-01-01 | End: 2021-07-07 | Stop reason: HOSPADM

## 2021-01-01 RX ORDER — SODIUM CHLORIDE 0.9 % (FLUSH) 0.9 %
10 SYRINGE (ML) INJECTION EVERY 12 HOURS
Status: DISCONTINUED | OUTPATIENT
Start: 2021-01-01 | End: 2021-07-07 | Stop reason: HOSPADM

## 2021-01-01 RX ORDER — ONDANSETRON 2 MG/ML
4 INJECTION INTRAMUSCULAR; INTRAVENOUS EVERY 6 HOURS PRN
Status: DISCONTINUED | OUTPATIENT
Start: 2021-01-01 | End: 2021-07-07 | Stop reason: HOSPADM

## 2021-01-01 RX ORDER — IPRATROPIUM BROMIDE AND ALBUTEROL SULFATE 2.5; .5 MG/3ML; MG/3ML
3 SOLUTION RESPIRATORY (INHALATION)
Status: DISCONTINUED | OUTPATIENT
Start: 2021-01-01 | End: 2021-07-07 | Stop reason: HOSPADM

## 2021-01-01 RX ORDER — PANTOPRAZOLE SODIUM 40 MG/1
40 TABLET, DELAYED RELEASE ORAL
Status: DISCONTINUED | OUTPATIENT
Start: 2021-01-01 | End: 2021-01-01

## 2021-01-01 RX ORDER — MORPHINE SULFATE 2 MG/ML
1 INJECTION, SOLUTION INTRAMUSCULAR; INTRAVENOUS EVERY 4 HOURS PRN
Status: DISCONTINUED | OUTPATIENT
Start: 2021-01-01 | End: 2021-01-01

## 2021-01-01 RX ORDER — POTASSIUM CHLORIDE 750 MG/1
10 CAPSULE, EXTENDED RELEASE ORAL DAILY
Status: DISCONTINUED | OUTPATIENT
Start: 2021-01-01 | End: 2021-07-07 | Stop reason: HOSPADM

## 2021-01-01 RX ORDER — ACETAMINOPHEN 325 MG/1
650 TABLET ORAL EVERY 6 HOURS PRN
Status: DISCONTINUED | OUTPATIENT
Start: 2021-01-01 | End: 2021-01-01

## 2021-01-01 RX ORDER — POLYETHYLENE GLYCOL 3350 17 G/17G
17 POWDER, FOR SOLUTION ORAL DAILY
Status: DISCONTINUED | OUTPATIENT
Start: 2021-01-01 | End: 2021-07-07 | Stop reason: HOSPADM

## 2021-01-01 RX ORDER — PREDNISONE 10 MG/1
10 TABLET ORAL
Status: DISCONTINUED | OUTPATIENT
Start: 2021-01-01 | End: 2021-07-07 | Stop reason: HOSPADM

## 2021-01-01 RX ORDER — BISACODYL 10 MG
10 SUPPOSITORY, RECTAL RECTAL DAILY PRN
Status: DISCONTINUED | OUTPATIENT
Start: 2021-01-01 | End: 2021-01-01

## 2021-01-01 RX ORDER — FONDAPARINUX SODIUM 2.5 MG/.5ML
2.5 INJECTION SUBCUTANEOUS
Status: DISCONTINUED | OUTPATIENT
Start: 2021-01-01 | End: 2021-07-07 | Stop reason: HOSPADM

## 2021-07-02 NOTE — H&P
Misael Douglas M.D.  GUILLERMO Melendez          Internal Medicine History and Physical      Name: Radha Varela  MRN: [id number]     Acct: [id number]  Room: [facility]/    Admit Date: 7/1/2021  PCP: Eduardo Saba MD    Chief Complaint:     Shortness of breath, weakness    History Obtained From:     chart review and the patient    History of Present Illness:      Radha Varela is a  75 y.o.  female who presents with need for continued oxygen weaning and rehabilitation following recent acute care stay. The patient had been in her usual state of health when she developed increased shortness of breath with fever and chills as well as diarrhea. When her symptoms persisted over about 5 days, she presented to the ER for evaluation and treatment. On intake, she was noted to be positive for COVID-19 as well as C. Diff. The patient declined convalescent plasma, but completed treatment with Remdesivir and IV steroids. Unfortunately, she had a continued decline from a respiratory standpoint. Initially, she was maintaining adequate 02 sats with 02 at 2 lpm. She required 100% NRB and repeat imaging showed worsening of disease. ID was consulted at that point and the patient was treated with Iv antibiotics for possible superimposed bacterial infection. [facility] when she developed fever and increased oxygen demand. The patient declined intubation/mechanical ventilation and changed code status to DNR/DNI. Code status was later changed back to full code. The patient had some mild improvement from respiratory standpoint and has tolerated heated hi flow oxygen. Due to her need for continued oxygen weaning and rehabilitation efforts, she transferred to our facility. She is maintaining adequate 02 sats and appears in no acute distress with 02 at 30 lpm/70%.     Past Medical History:     Atrial  fibrillation  CAD  Emphysema  GERD  ASCUS  HLD  Mitral valve prolapse    Past Surgical History:     Bilateral cataract extraction  Cholecystectomy  Tubal ligation    Medications Prior to Admission:       See chart    Allergies:       Benadryl allergy childrens [diphenhydramine hcl], Celexa [citalopram], Codeine, Floxin otic [ofloxacin], Lortab [hydrocodone-acetaminophen], and Sulfa antibiotics    Social History:     Tobacco:    has no history on file for tobacco use.  Alcohol:      has no history on file for alcohol use.  Drug Use:  has no history on file for drug use.    Family History:     No family history on file.    Review of Systems:     Review of Systems   Constitutional: Positive for malaise/fatigue. Negative for chills, decreased appetite, weight gain and weight loss.   HENT: Negative for congestion, ear discharge, hoarse voice and tinnitus.    Eyes: Negative for blurred vision, discharge, visual disturbance and visual halos.   Cardiovascular: Positive for dyspnea on exertion. Negative for chest pain, claudication, irregular heartbeat, leg swelling, orthopnea and paroxysmal nocturnal dyspnea.   Respiratory: Positive for shortness of breath. Negative for cough, sputum production and wheezing.    Endocrine: Negative for cold intolerance, heat intolerance and polyuria.   Hematologic/Lymphatic: Negative for adenopathy. Does not bruise/bleed easily.   Skin: Negative for dry skin, itching and suspicious lesions.   Musculoskeletal: Negative for arthritis, back pain, falls, joint pain, muscle weakness and myalgias.   Gastrointestinal: Negative for abdominal pain, constipation, diarrhea, dysphagia and hematemesis.   Genitourinary: Negative for bladder incontinence, dysuria and frequency.   Neurological: Positive for weakness. Negative for aphonia, disturbances in coordination and dizziness.   Psychiatric/Behavioral: Negative for altered mental status, depression, memory loss and substance abuse. The patient does not  "have insomnia and is not nervous/anxious.        Code Status:    There are no questions and answers to display.       Physical Exam:     Vitals:  Ht 160 cm (63\")   Wt 82.7 kg (182 lb 4.8 oz)   BMI 32.29 kg/m²   T 98.2 P 71 R 25 BP 91/47 Sp02 92% (30lpm/70%)  Physical Exam  Vitals and nursing note reviewed.   Constitutional:       Appearance: Normal appearance.   HENT:      Head: Normocephalic and atraumatic.      Right Ear: Tympanic membrane normal.      Left Ear: Tympanic membrane normal.      Nose: Nose normal.      Mouth/Throat:      Mouth: Mucous membranes are moist.      Pharynx: Oropharynx is clear.   Eyes:      Extraocular Movements: Extraocular movements intact.      Conjunctiva/sclera: Conjunctivae normal.      Pupils: Pupils are equal, round, and reactive to light.   Cardiovascular:      Rate and Rhythm: Normal rate and regular rhythm.      Pulses: Normal pulses.      Heart sounds: Normal heart sounds.   Pulmonary:      Effort: Pulmonary effort is normal.      Breath sounds: Normal breath sounds.   Abdominal:      General: Abdomen is flat. Bowel sounds are normal.      Palpations: Abdomen is soft.   Musculoskeletal:      Cervical back: Normal range of motion and neck supple.      Comments: Generalized weakness   Skin:     General: Skin is warm and dry.      Comments: Abrasion right face  Bruising to extremities   Neurological:      Mental Status: She is alert and oriented to person, place, and time.   Psychiatric:         Mood and Affect: Mood normal.         Behavior: Behavior normal.               Data:     Lab Results (last 7 days)     Procedure Component Value Units Date/Time    Manual Differential [[id number]]  (Abnormal) Collected: 07/02/21 0627    Specimen: Blood Updated: 07/02/21 0710     Neutrophil % 77.8 %      Lymphocyte % 11.1 %      Monocyte % 6.1 %      Eosinophil % 4.0 %      Myelocyte % 1.0 %      Neutrophils Absolute 8.85 10*3/mm3      Lymphocytes Absolute 1.26 10*3/mm3      Monocytes " Absolute 0.69 10*3/mm3      Eosinophils Absolute 0.46 10*3/mm3      nRBC 1.0 /100 WBC      Anisocytosis Slight/1+     Basophilic Stippling Slight/1+     Poikilocytes Slight/1+     Polychromasia Slight/1+     WBC Morphology Normal     Platelet Morphology Normal    Comprehensive Metabolic Panel [[id number]]  (Abnormal) Collected: 07/02/21 0627    Specimen: Blood Updated: 07/02/21 0700     Glucose 103 mg/dL      BUN 28 mg/dL      Creatinine 0.55 mg/dL      Sodium 133 mmol/L      Potassium 4.4 mmol/L      Comment: Slight hemolysis detected by analyzer. Results may be affected.        Chloride 95 mmol/L      CO2 32.0 mmol/L      Calcium 8.8 mg/dL      Total Protein 5.5 g/dL      Albumin 2.50 g/dL      ALT (SGPT) 21 U/L      AST (SGOT) 21 U/L      Alkaline Phosphatase 113 U/L      Total Bilirubin 0.7 mg/dL      eGFR Non African Amer 108 mL/min/1.73      Globulin 3.0 gm/dL      A/G Ratio 0.8 g/dL      BUN/Creatinine Ratio 50.9     Anion Gap 6.0 mmol/L     Narrative:      GFR Normal >60  Chronic Kidney Disease <60  Kidney Failure <15      CBC & Differential [[id number]]  (Abnormal) Collected: 07/02/21 0627    Specimen: Blood Updated: 07/02/21 0643    Narrative:      The following orders were created for panel order CBC & Differential.  Procedure                               Abnormality         Status                     ---------                               -----------         ------                     CBC Auto Differential[[id number]]        Abnormal            Final result                 Please view results for these tests on the individual orders.    CBC Auto Differential [[id number]]  (Abnormal) Collected: 07/02/21 0627    Specimen: Blood Updated: 07/02/21 0643     WBC 11.38 10*3/mm3      RBC 3.45 10*6/mm3      Hemoglobin 9.9 g/dL      Hematocrit 30.0 %      MCV 87.0 fL      MCH 28.7 pg      MCHC 33.0 g/dL      RDW 18.5 %      RDW-SD 56.1 fl      MPV 10.6 fL      Platelets 391 10*3/mm3      nRBC 0.5 /100 WBC      Prealbumin [[id number]] Collected: 07/02/21 0627    Specimen: Blood Updated: 07/02/21 0638        No results found.      Assessment:         * No active hospital problems. *    No past medical history on file.    Plan:     1. Acute on chronic hypoxic respiratory failure  2. S/p COVID-19  3. Multifactorial anemia  4. PAF  5. COPD  6. GERD  7. Hx CAD    Continue current treatment. Monitor counts. Increase activity. Labs Monday. Maintain patient safety. Oxygen weaning as tolerated. Continue gentle diuresis. Continue steroid weaning. Aggressive therapies as tolerated.       Electronically signed by GUILLERMO Nevarez on 7/2/2021 at 10:46 CDT     Copy sent to Eduardo Pulido MD  I have discussed the care of Radha Varela, including pertinent history and exam findings, with the nurse practitioner.    I have seen and examined the patient and the key elements of all parts of the encounter have been performed by me.  I agree with the assessment, plan and orders as documented by GUILLERMO Melendez, after I modified the exam findings and the plan of treatments and the final version is my approved version of the assessment.        Electronically signed by Florencio Douglas MD on 7/2/2021 at 16:02 CDT

## 2021-07-02 NOTE — CONSULTS
PULMONARY AND CRITICAL CARE CONSULT - [facility]    Radha Varela   MR# [id number]  Acct# [id number]  7/2/2021   14:17 CDT    Referring Provider: Florencio Douglas MD    Chief Complaint: Shortness of breath    HPI: We are consulted by Florencio Douglas MD to see this 75 y.o. female born on 1945.  Patient complains of dyspnea in the chest for several weeks. Severity: moderate.  Aggravating factors: coughing and walking.   Alleviating factors: medication(s) (steroids, duonebs and guaifenesin) Associated symptoms: fatigue, poor exercise tolerance and shortness of breath. Sputum is white.  Patient currently is not on home oxygen therapy.. Respiratory history: She reports history of COPD  and emphysema. She tells me she uses Advair at home. She was admitted in [place] from 6--7- with shortness of breath, covid infection and secondary bacterial pneumonia. She was treated for covid with remdesivir and steroids but refused convalescent plasma. She was also treated from 6-17 to 6-23 with vancomycin and ceftriaxone. She spiked temperature on 6-23 and had increased oxygen requirements and was again treated with antibiotics, merrem for seven days. She was discharged on 7-1 to [facility] for rehab and titration of high flow oxygen. She is sitting up in bed this morning on vapotherm 30L fio2 0.7 with an oxygen saturation of 92%. Afebrile.     Past Medical History  She reports history of COPD, emphysema, atrial fibrillation on chronic xarelto,cad and gerd.   has no past surgical history on file.  Allergies   Allergen Reactions   • Benadryl Allergy Childrens [Diphenhydramine Hcl] Unknown - Low Severity   • Celexa [Citalopram] Unknown - Low Severity   • Codeine Unknown - Low Severity   • Floxin Otic [Ofloxacin] Unknown - Low Severity   • Lortab [Hydrocodone-Acetaminophen] Unknown - Low Severity   • Sulfa Antibiotics Unknown - Low Severity     Medications  aspirin, 81 mg, Oral, Daily  b  complex-vitamin c-folic acid, 1 tablet, Oral, Daily  dilTIAZem CD, 180 mg, Oral, Nightly  DULoxetine, 30 mg, Oral, Daily  fondaparinux, 2.5 mg, Subcutaneous, Q24H  furosemide, 20 mg, Intravenous, BID  guaiFENesin, 600 mg, Oral, Q12H  ipratropium-albuterol, 3 mL, Nebulization, Q6H - RT  nystatin, 5 mL, Swish & Swallow, Q6H  pantoprazole, 20 mg, Oral, Q AM  polyethylene glycol, 17 g, Oral, Daily  potassium chloride, 10 mEq, Oral, Daily  predniSONE, 30 mg, Oral, Daily With Breakfast   Followed by  [START ON 7/3/2021] predniSONE, 25 mg, Oral, Daily With Breakfast   Followed by  [START ON 7/4/2021] predniSONE, 20 mg, Oral, Daily With Breakfast   Followed by  [START ON 7/5/2021] predniSONE, 15 mg, Oral, Daily With Breakfast   Followed by  [START ON 7/6/2021] predniSONE, 10 mg, Oral, Daily With Breakfast   Followed by  [START ON 7/7/2021] predniSONE, 5 mg, Oral, Daily With Breakfast  saccharomyces boulardii, 250 mg, Oral, BID  sodium chloride, 10 mL, Intravenous, Q12H  vancomycin, 1,000 mg, Intravenous, Q18H  vitamin E, 400 Units, Oral, Daily         Social History     Family History  family history is not on file.  Review of Systems:  Review of Systems   Constitutional: Negative for fever.   HENT: Negative for congestion.    Eyes: Negative for visual disturbance.   Respiratory: Positive for cough and shortness of breath. Negative for wheezing.    Cardiovascular: Negative for chest pain.   Gastrointestinal: Negative for abdominal distention and nausea.   Genitourinary: Negative for hematuria.   Musculoskeletal: Negative for arthralgias.   Skin: Negative for rash.   Neurological: Positive for weakness.   Psychiatric/Behavioral: Negative for agitation.     Physical Exam:  Vital signs: T:98.2   BP:91/47   P:60  R:24   Sat:92  Physical Exam  Vitals and nursing note reviewed.   Constitutional:       General: She is not in acute distress.     Interventions: Nasal cannula in place.      Comments: vapotherm   HENT:      Head:  Normocephalic.      Nose: Nose normal.   Eyes:      General: No scleral icterus.  Cardiovascular:      Rate and Rhythm: Normal rate and regular rhythm.   Pulmonary:      Effort: No respiratory distress.      Breath sounds: Rales present. No wheezing.   Abdominal:      General: Bowel sounds are normal.      Palpations: Abdomen is soft.   Musculoskeletal:      Right lower leg: No edema.      Left lower leg: No edema.   Skin:     Findings: Bruising (large bruise left ankle) present.   Neurological:      Mental Status: She is alert and oriented to person, place, and time.       Lab Data:  Results from last 7 days   Lab Units 07/02/21  0627   WBC 10*3/mm3 11.38*   HEMOGLOBIN g/dL 9.9*   PLATELETS 10*3/mm3 391     Results from last 7 days   Lab Units 07/02/21  0627 07/01/21  0343 06/30/21  0454   SODIUM mmol/L 133* 138 139   POTASSIUM mmol/L 4.4 4.6 3.9   TOTAL CO2 mmol/L  --  33* 31   CO2 mmol/L 32.0*  --   --    BUN mg/dL 28* 30* 25*   CREATININE mg/dL 0.55* 0.65 0.67   GLUCOSE mg/dL 103*  --   --      Results from last 7 days   Lab Units 06/29/21  0829 06/27/21  1459   PH, ARTERIAL  7.51* 7.55*   PCO2, ARTERIAL mm Hg 35 43     No results found for: BLOODCX, URINECX, WOUNDCX, MRSACX, RESPCX, STOOLCX  No results found for: PROBNP  Recent radiology:   Imaging Results (Last 72 Hours)     ** No results found for the last 72 hours. **        My radiograph interpretation/independent review of imaging: none today 7-2-2021  Other test results (not lab or imaging): None available 7-2-2021  Independent review of ekg: None available 7-2-2021    CT chest at referring facility:  CT angiography of the chest   History: Shortness of breath   Technique: Images through the chest were obtained following intravenous contrast administration.   Findings: Extensive diffuse bilateral lung infiltrates are present. Infiltrates have increased significantly since 2 June 2021. There is no pneumothorax or pleural effusion. The heart is enlarged. The  pulmonary artery is enlarged, consistent with   pulmonary artery hypertension. There is no pulmonary embolus. There is no axillary, hilar or mediastinal adenopathy.   Impression:   Significantly increased extensive diffuse bilateral lung infiltrates.   Emphysema.   Cardiomegaly.   Pulmonary artery hypertension.   Negative for pulmonary embolus.   Electronically signed by: Akbar Osborne on 2021 20:33:31      Problem List as identified by Epic (may contain historical, inactive problems)  There is no problem list on file for this patient.    Pulmonary Assessment:   New problem (to me), with additional workup planned: Acute hypoxic respiratory failure, persistent, following covid 19  New problem (to me), no additional workup planned: atrial fibrillation, currently nsr   Other problems either stable, failing to improve or worsenin. Status post Covid-19  2. Reported COPD history  3. Reported emphysema  4. GERD    Recommend/plan:   · Continue supplemental oxygen to maintain saturations above 90%, currently on vapotherm 30L fio2 0.7. Titrate fio2 as tolerated  · Duo nebs Q6  · Guaifenesin bid   · Continue to taper prednisone  · CXRs prn  · Vancomycin stop date 2021  · DVT prophylaxis, on home xarelto  · D/c cpap  · PT/OT, mobilize as tolerated  · Continue protonix for gerd   · Further recommendations per Dr. Bryant    Thank you for this consult.  We will follow along.  Electronically signed by GUILLERMO Murphy on 2021 at 14:17 CDT     Physician substantive portion:  Records from [place] are reviewed indicating (in summary) use of protracted taper of dexamethasone following covid with persistent ground glass densities on xray.  She has history of afib, cad, emphysema, GERD, abn pap smear, hyperlipidemia and mitral valve prolapse, hx cholecystectomy.  Family hx positive for cancer, heart disease, liver disease.  She is a former smoker.  No other exposures.  Now txf for ongoing care.  She has completed  antibiotics.  I recommend tapering steroids as quickly as can be achieved given the multiple infectious complications of current illness that she has had.  On exam she is in no distress, normally conversant, on vapotherm high flow oxygen.  Breath sounds a re a little diminished with a few crackles. Other than prednisone taper, I recommend continued supportive care, continued tx for GERD, continue bronchodilators.  Physical therapy.    I have seen and examined patient personally, performing a face-to-face diagnostic evaluation with plan of care reviewed and developed with APRN and nursing staff. I have addended and/or modified the above history of present illness, physical examination, and assessment and plan to reflect my findings and impressions. Essential elements of the care plan were discussed with APRN above.  Agree with findings and assessment/plan as documented above.    Electronically signed by Jacoby Bryant MD, on 7/2/2021, 16:53 CDT

## 2021-07-02 NOTE — PROGRESS NOTES
"Adult Nutrition  Assessment/PES    Patient Name:  Radha Lowryn  YOB: 1945  MRN: [id number]  Admit Date:  7/1/2021    Assessment Date:  7/2/2021    Comments:  Initial RDN eval. Pt reports fair appetite, no PO intake documented at this time. SLP evaluated and recommended [facility] Soft/Ground diet with Thin liquids, Pt also with cardiac diet restriction. Encouraged intake, interviewed for preferences, advised Pt of alt menu options and communicating preferences with catering associated, as well as recommended/ordered Boost Plus BID.  Will continue to follow.    Reason for Assessment     Row Name 07/02/21 1624          Reason for Assessment    Reason For Assessment  per organizational policy;physician consult     Diagnosis  pulmonary disease;infection/sepsis         Nutrition/Diet History     Row Name 07/02/21 1624          Nutrition/Diet History    Typical Food/Fluid Intake  Pt reports a fair appetite; states she enjoyed lunch today.     Supplemental Drinks/Foods/Additives  doesn't particularly like Boost but is willing to drink them. prefers the chocolate or strawberry.         Anthropometrics     Row Name 07/02/21 1633 07/02/21 1630       Anthropometrics    Height  160 cm (63\")  --       Admit Weight    Admit Weight  --  82.7 kg (182 lb 4.8 oz)       Ideal Body Weight (IBW)    Ideal Body Weight (IBW) (kg)  52.72  --       Body Mass Index (BMI)    BMI Assessment  --  BMI 30-34.9: obesity grade I    Row Name 07/02/21 0900          Anthropometrics    Weight  82.7 kg (182 lb 4.8 oz)         Labs/Tests/Procedures/Meds     Row Name 07/02/21 1632          Labs/Procedures/Meds    Lab Results Reviewed  reviewed, pertinent        Diagnostic Tests/Procedures    Diagnostic Test/Procedure Reviewed  reviewed     Diagnostic Test/Procedures Comments  SLP eval        Medications    Pertinent Medications Reviewed  reviewed, pertinent     Pertinent Medications Comments  see MAR         Physical Findings     Row Name " "07/02/21 1632          Physical Findings    Overall Physical Appearance  on oxygen therapy     Skin  other (see comments) Valerio Score 17 brusing to R cheek, R arm, abdomen, L ankle         Estimated/Assessed Needs     Row Name 07/02/21 1633          Calculation Measurements    Weight Used For Calculations  82.7 kg (182 lb 4.8 oz)     Height  160 cm (63\")        Estimated/Assessed Needs    Additional Documentation  Fluid Requirements (Group);Sargent-St. Jeor Equation (Group);Protein Requirements (Group)        Sargent-St. Jeor Equation    RMR (Sargent-St. Jeor Equation)  1301.035     Sargent-St. Jeor Activity Factors  1.4 - 1.5     Activity Factors (Sargent-St. Jeor)  1821.449 - 1951.5576        Protein Requirements    Weight Used For Protein Calculations  82.7 kg (182 lb 4.8 oz)     Est Protein Requirement Amount (gms/kg)  1.0 gm protein     Estimated Protein Requirements (gms/day)  82.69        Fluid Requirements    Fluid Requirements (mL/day)  2067.5 25 ml/kg     RDA Method (mL)  2067.5         Nutrition Prescription Ordered     Row Name 07/02/21 1634          Nutrition Prescription PO    Current PO Diet  Soft Texture     Texture  Ground     Fluid Consistency  Thin     Common Modifiers  Cardiac         Evaluation of Received Nutrient/Fluid Intake     Row Name 07/02/21 1633          Nutrient/Fluid Evaluation    Number of Days Evaluated  Other (comment) <24 hr intake     Additional Documentation  Fluid Intake Evaluation (Group)        Fluid Intake Evaluation    Oral Fluid (mL)  720        PO Evaluation    Number of Days PO Intake Evaluated  Insufficient Data <24 hr intake               Problem/Interventions:  Problem 1     Row Name 07/02/21 1635          Nutrition Diagnoses Problem 1    Problem 1  Predicted Suboptimal Intake     Etiology (related to)  Medical Diagnosis     Nutrition related  Increased nutrition needs     Hematological  Anemia     Pulmonary/Critical Care  A/C respiratory failure;COPD;Other (comment) " s/p COVID 19     Signs/Symptoms (evidenced by)  PO Intake;Report/Observation     Percent (%) intake recorded  -- insuff data     Appetite  Fair               Intervention Goal     Row Name 07/02/21 1637          Intervention Goal    General  Meet nutritional needs for age/condition;Disease management/therapy     PO  Establish PO;Meet estimated needs     Weight  No significant weight loss         Nutrition Intervention     Row Name 07/02/21 1637          Nutrition Intervention    RD/Tech Action  Advise alternate selection;Interview for preference;Encourage intake;Recommend/ordered;Follow Tx progress;Care plan reviewd     Recommended/Ordered  Supplement         Nutrition Prescription     Row Name 07/02/21 1637          Nutrition Prescription PO    PO Prescription  Begin/change supplement     Supplement  Boost Glucose Control;Boost Plus     Supplement Frequency  2 times a day     New PO Prescription Ordered?  Yes         Education/Evaluation     Row Name 07/02/21 1638          Education    Education  No discharge needs identified at this time        Monitor/Evaluation    Monitor  Per protocol           Electronically signed by:  Eneida Oconnor RDN, SHELIA  07/02/21 16:39 CDT

## 2021-07-03 NOTE — PROGRESS NOTES
Misael Douglas M.D.  GUILLERMO Melendez        Internal Medicine Progress Note    7/3/2021   07:44 CDT    Name:  Radha Varela  MRN:    [id number]     Acct:     [id number]   Room:  [address] [place] [address] Day: 0     Admit Date: 7/1/2021  7:50 PM  PCP: Eduardo Saba MD    Subjective:     C/C: Shortness of breath, weakness     Interval History: Status:  Stable.  Resting in bed. No family at bedside.  Pt states she rested well through the night.  She is eager to work with therapy and get in chair today.      Review of Systems   Constitutional: Negative for chills and decreased appetite.   HENT: Negative for ear pain and nosebleeds.    Eyes: Negative for blurred vision and pain.   Cardiovascular: Positive for dyspnea on exertion. Negative for chest pain.   Respiratory: Positive for shortness of breath. Negative for cough.    Endocrine: Negative for cold intolerance and heat intolerance.   Skin: Negative for dry skin, itching and rash.   Musculoskeletal: Positive for muscle weakness. Negative for back pain and falls.   Gastrointestinal: Negative for abdominal pain, nausea and vomiting.   Genitourinary: Negative for flank pain and hematuria.   Neurological: Positive for weakness. Negative for headaches, numbness and paresthesias.   Psychiatric/Behavioral: Negative for altered mental status, depression and substance abuse.   Allergic/Immunologic: Negative for environmental allergies, HIV exposure and hives.         Medications:     Allergies:   Allergies   Allergen Reactions   • Benadryl Allergy Childrens [Diphenhydramine Hcl] Unknown - Low Severity   • Celexa [Citalopram] Unknown - Low Severity   • Codeine Unknown - Low Severity   • Floxin Otic [Ofloxacin] Unknown - Low Severity   • Lortab [Hydrocodone-Acetaminophen] Unknown - Low Severity   • Sulfa Antibiotics Unknown - Low Severity       Current Meds:   Current Facility-Administered Medications:   •  acetaminophen (TYLENOL) tablet 650 mg, 650 mg, Oral, Q6H  PRN, Florencio Douglas MD  •  ALPRAZolam (XANAX) tablet 0.25 mg, 0.25 mg, Oral, BID PRN, Florencio Douglas MD  •  aspirin chewable tablet 81 mg, 81 mg, Oral, Daily, Florencio Douglas MD  •  b complex-vitamin c-folic acid (NEPHRO-CORI) tablet 1 tablet, 1 tablet, Oral, Daily, Florencio Douglas MD  •  bisacodyl (DULCOLAX) suppository 10 mg, 10 mg, Rectal, Daily PRN, Florencio Douglas MD  •  dilTIAZem CD (CARDIZEM CD) 24 hr capsule 180 mg, 180 mg, Oral, Nightly, Florencio Douglas MD  •  DULoxetine (CYMBALTA) DR capsule 30 mg, 30 mg, Oral, Daily, Florencio Douglas MD  •  fondaparinux (ARIXTRA) injection 2.5 mg, 2.5 mg, Subcutaneous, Q24H, Florencio Douglas MD  •  furosemide (LASIX) injection 20 mg, 20 mg, Intravenous, BID, Florencio Douglas MD  •  guaiFENesin (MUCINEX) 12 hr tablet 600 mg, 600 mg, Oral, Q12H, Florencio Douglas MD  •  ipratropium-albuterol (DUO-NEB) nebulizer solution 3 mL, 3 mL, Nebulization, Q6H - RT, Florencio Douglas MD  •  Morphine sulfate (PF) injection 1 mg, 1 mg, Intravenous, Q4H PRN, Florencio Douglas MD  •  nystatin (MYCOSTATIN) 700882 UNIT/ML suspension 500,000 Units, 5 mL, Swish & Swallow, Q6H, Florencio Douglas MD  •  ondansetron (ZOFRAN) injection 4 mg, 4 mg, Intravenous, Q6H PRN, Florencio Douglas MD  •  pantoprazole (PROTONIX) EC tablet 20 mg, 20 mg, Oral, Q AM, Florencio Douglas MD  •  polyethylene glycol (MIRALAX) packet 17 g, 17 g, Oral, Daily, lForencio Douglas MD  •  potassium chloride (MICRO-K) CR capsule 10 mEq, 10 mEq, Oral, Daily, Florencio Douglas MD  •  predniSONE (DELTASONE) tablet 30 mg, 30 mg, Oral, Daily With Breakfast **FOLLOWED BY** predniSONE (DELTASONE) tablet 25 mg, 25 mg, Oral, Daily With Breakfast **FOLLOWED BY** [START ON 7/4/2021] predniSONE (DELTASONE) tablet 20 mg, 20 mg, Oral, Daily With Breakfast **FOLLOWED BY** [START ON 7/5/2021] predniSONE (DELTASONE) tablet 15 mg, 15 mg,  "Oral, Daily With Breakfast **FOLLOWED BY** [START ON 7/6/2021] predniSONE (DELTASONE) tablet 10 mg, 10 mg, Oral, Daily With Breakfast **FOLLOWED BY** [START ON 7/7/2021] predniSONE (DELTASONE) tablet 5 mg, 5 mg, Oral, Daily With Breakfast, Florencio Douglas MD  •  saccharomyces boulardii (FLORASTOR) capsule 250 mg, 250 mg, Oral, BID, Florencio Douglas MD  •  sodium chloride 0.9 % flush 10 mL, 10 mL, Intravenous, Q12H, Florencio Douglas MD  •  sodium chloride 0.9 % flush 10 mL, 10 mL, Intravenous, PRN, Florencio Douglas MD  •  vancomycin 1 g/250 mL 0.9% NS (vial-mate), 1,000 mg, Intravenous, Q18H, Florencio Douglas MD  •  vitamin E capsule 400 Units, 400 Units, Oral, Daily, Florencio Douglas MD    Data:     Code Status:    There are no questions and answers to display.       No family history on file.    Social History     Socioeconomic History   • Marital status:      Spouse name: Not on file   • Number of children: Not on file   • Years of education: Not on file   • Highest education level: Not on file       Vitals:  Ht 160 cm (63\")   Wt 82.7 kg (182 lb 4.8 oz)   BMI 32.29 kg/m²     T 98.3 HR 67 RR 24 BP 94/51 SPO2 95%        I/O (24Hr):  No intake or output data in the 24 hours ending 07/03/21 0744    Labs and imaging:      No results found for this or any previous visit (from the past 12 hour(s)).                            Physical Examination:        Physical Exam  Vitals and nursing note reviewed.   Constitutional:       Appearance: Normal appearance. She is normal weight.   HENT:      Head: Normocephalic and atraumatic.      Right Ear: External ear normal.      Left Ear: External ear normal.      Nose: Nose normal.      Mouth/Throat:      Mouth: Mucous membranes are dry.      Pharynx: Oropharynx is clear.   Eyes:      Conjunctiva/sclera: Conjunctivae normal.      Pupils: Pupils are equal, round, and reactive to light.   Cardiovascular:      Rate and Rhythm: Normal rate " and regular rhythm.      Pulses: Normal pulses.      Heart sounds: Normal heart sounds.   Pulmonary:      Effort: Pulmonary effort is normal.      Breath sounds: Normal breath sounds.   Abdominal:      General: Abdomen is flat. Bowel sounds are normal.      Palpations: Abdomen is soft.   Musculoskeletal:         General: Normal range of motion.      Cervical back: Normal range of motion and neck supple.      Comments: Generalized weakness   Skin:     General: Skin is warm and dry.      Capillary Refill: Capillary refill takes less than 2 seconds.      Comments: Bruising to extremities and face    Neurological:      Mental Status: She is alert.   Psychiatric:         Mood and Affect: Mood normal.         Behavior: Behavior normal.           Assessment:        Primary Problem  <principal problem not specified>       * No active hospital problems. *    No past medical history on file.     Plan:        1. Acute on chronic hypoxic respiratory failure  2. S/p COVID-19  3. Multifactorial anemia  4. PAF  5. COPD  6. GERD  7. Hx CAD     Continue current treatment. Monitor counts. Increase activity. Labs Monday. Maintain patient safety. Oxygen weaning as tolerated. Continue gentle diuresis. Continue steroid weaning. Aggressive therapies as tolerated.       Electronically signed by GUILLERMO Coronado on 7/3/2021 at 07:44 CDT     I have discussed the care of Radha Varela, including pertinent history and exam findings, with the nurse practitioner.    I have seen and examined the patient and the key elements of all parts of the encounter have been performed by me.  I agree with the assessment, plan and orders as documented by GUILLERMO Hopson, after I modified the exam findings and the plan of treatments and the final version is my approved version of the assessment.        Electronically signed by Florencio Douglas MD on 7/3/2021 at 18:33 CDT

## 2021-07-03 NOTE — PROGRESS NOTES
Mark Lovelace MD    Subjective:  LOS: 0    Sitting up in bed.  Currently on Vapotherm.  Recent SARS-CoV-2 infection.  Underlying COPD.    Objective   Vital Signs past 24hrs    Temp range: 98.3  BP range: 94/51  Pulse range: 67  Resp rate range: 24   Oxygen range:95%     82.7 kg (182 lb 4.8 oz); Body mass index is 32.29 kg/m².  No intake or output data in the 24 hours ending 07/03/21 1245    Physical Exam  Eyes:      Pupils: Pupils are equal, round, and reactive to light.   Cardiovascular:      Rate and Rhythm: Normal rate and regular rhythm.      Heart sounds: No murmur heard.     Pulmonary:      Breath sounds: Rales present.   Abdominal:      General: Bowel sounds are normal.      Palpations: Abdomen is soft. There is no mass.      Tenderness: There is no abdominal tenderness.   Musculoskeletal:         General: No swelling.   Neurological:      Mental Status: She is alert.       Results Review:    I have reviewed the laboratory and imaging data since the last note by LPC physician.  My annotations are noted in assessment and plan.    Medication Review:  I have reviewed the current MAR.  My annotations are noted in assessment and plan.       Plan   PCCM Problems  Acute respiratory failure  History of SARS-CoV-2 infection  COPD  Anemia  Relevant Medical Diagnoses  Recent C. difficile infection  A. fib  CAD      THESE ARE NEW MEDICAL PROBLEMS TO ME.    Plan of Treatment  Continue supplemental oxygen through Vapotherm and wean as tolerated.    Will obtain chest x-ray and review.    She was treated for secondary bacterial infection after Covid and remains on vancomycin.    She is currently on a prednisone taper and is not wheezing.    She is on prophylactic dose anticoagulation.    Mild anemia appears stable.    Mark Lovelace MD  07/03/21  12:45 CDT    While in the room and during my examination of the patient I wore gloves, gown, mask, eye protection and followed enhanced droplet/contact isolation protocol and  precautions.  I washed my hands before and after this patient encounter.    Part of this note may be an electronic transcription/translation of spoken language to printed text using the Dragon Dictation System.

## 2021-07-04 NOTE — PROGRESS NOTES
Misael Douglas M.D.  GUILLERMO Melendez        Internal Medicine Progress Note    7/4/2021   07:28 CDT    Name:  Radha Varela  MRN:    [id number]     Acct:     [id number]   Room:  [address] [place] [address] Day: 0     Admit Date: 7/1/2021  7:50 PM  PCP: Eduardo Saba MD    Subjective:     C/C: Shortness of breath, weakness     Interval History: Status:  Stable.  Sleeping in bed, awakens easily. No family at bedside. Tolerating HHF. No complaints.     Review of Systems   Constitutional: Negative for chills and decreased appetite.   HENT: Negative for ear pain and nosebleeds.    Eyes: Negative for blurred vision and pain.   Cardiovascular: Positive for dyspnea on exertion. Negative for chest pain.   Respiratory: Positive for shortness of breath. Negative for cough.    Endocrine: Negative for cold intolerance and heat intolerance.   Skin: Negative for dry skin, itching and rash.   Musculoskeletal: Positive for muscle weakness. Negative for back pain and falls.   Gastrointestinal: Negative for abdominal pain, nausea and vomiting.   Genitourinary: Negative for flank pain and hematuria.   Neurological: Positive for weakness. Negative for headaches, numbness and paresthesias.   Psychiatric/Behavioral: Negative for altered mental status, depression and substance abuse.   Allergic/Immunologic: Negative for environmental allergies, HIV exposure and hives.         Medications:     Allergies:   Allergies   Allergen Reactions   • Benadryl Allergy Childrens [Diphenhydramine Hcl] Unknown - Low Severity   • Celexa [Citalopram] Unknown - Low Severity   • Codeine Unknown - Low Severity   • Floxin Otic [Ofloxacin] Unknown - Low Severity   • Lortab [Hydrocodone-Acetaminophen] Unknown - Low Severity   • Sulfa Antibiotics Unknown - Low Severity       Current Meds:   Current Facility-Administered Medications:   •  acetaminophen (TYLENOL) tablet 650 mg, 650 mg, Oral, Q6H PRN, Florencio Douglas MD  •  ALPRAZolam (XANAX) tablet  0.25 mg, 0.25 mg, Oral, BID PRN, Florencio Douglas MD  •  aspirin chewable tablet 81 mg, 81 mg, Oral, Daily, Florencio Douglas MD  •  b complex-vitamin c-folic acid (NEPHRO-CORI) tablet 1 tablet, 1 tablet, Oral, Daily, Florencio Douglas MD  •  bisacodyl (DULCOLAX) suppository 10 mg, 10 mg, Rectal, Daily PRN, Florencio Douglas MD  •  dilTIAZem CD (CARDIZEM CD) 24 hr capsule 180 mg, 180 mg, Oral, Nightly, Florencio Douglas MD  •  DULoxetine (CYMBALTA) DR capsule 30 mg, 30 mg, Oral, Daily, Florencio Douglas MD  •  fondaparinux (ARIXTRA) injection 2.5 mg, 2.5 mg, Subcutaneous, Q24H, Florencio Douglas MD  •  furosemide (LASIX) injection 20 mg, 20 mg, Intravenous, BID, Florencio Douglas MD  •  guaiFENesin (MUCINEX) 12 hr tablet 600 mg, 600 mg, Oral, Q12H, Florencio Douglas MD  •  ipratropium-albuterol (DUO-NEB) nebulizer solution 3 mL, 3 mL, Nebulization, Q6H - RT, Florencio Douglas MD  •  Morphine sulfate (PF) injection 1 mg, 1 mg, Intravenous, Q4H PRN, Florencio Douglas MD  •  nystatin (MYCOSTATIN) 010317 UNIT/ML suspension 500,000 Units, 5 mL, Swish & Swallow, Q6H, Florencio Douglas MD  •  ondansetron (ZOFRAN) injection 4 mg, 4 mg, Intravenous, Q6H PRN, Florencio Douglas MD  •  pantoprazole (PROTONIX) EC tablet 20 mg, 20 mg, Oral, Q AM, Florencio Douglas MD  •  polyethylene glycol (MIRALAX) packet 17 g, 17 g, Oral, Daily, Florencio Douglas MD  •  potassium chloride (MICRO-K) CR capsule 10 mEq, 10 mEq, Oral, Daily, Florencio Douglas MD  •  [] predniSONE (DELTASONE) tablet 30 mg, 30 mg, Oral, Daily With Breakfast **FOLLOWED BY** predniSONE (DELTASONE) tablet 25 mg, 25 mg, Oral, Daily With Breakfast **FOLLOWED BY** predniSONE (DELTASONE) tablet 20 mg, 20 mg, Oral, Daily With Breakfast **FOLLOWED BY** [START ON 2021] predniSONE (DELTASONE) tablet 15 mg, 15 mg, Oral, Daily With Breakfast **FOLLOWED BY** [START ON 2021] predniSONE  "(DELTASONE) tablet 10 mg, 10 mg, Oral, Daily With Breakfast **FOLLOWED BY** [START ON 7/7/2021] predniSONE (DELTASONE) tablet 5 mg, 5 mg, Oral, Daily With Breakfast, Florencio Douglas MD  •  saccharomyces boulardii (FLORASTOR) capsule 250 mg, 250 mg, Oral, BID, Florencio Douglas MD  •  sodium chloride 0.9 % flush 10 mL, 10 mL, Intravenous, Q12H, Florencio Douglas MD  •  sodium chloride 0.9 % flush 10 mL, 10 mL, Intravenous, PRN, Florencio Douglas MD  •  vancomycin 1 g/250 mL 0.9% NS (vial-mate), 1,000 mg, Intravenous, Q18H, Florencio Douglas MD  •  vitamin E capsule 400 Units, 400 Units, Oral, Daily, Florencio Douglas MD    Data:     Code Status:    There are no questions and answers to display.       No family history on file.    Social History     Socioeconomic History   • Marital status:      Spouse name: Not on file   • Number of children: Not on file   • Years of education: Not on file   • Highest education level: Not on file       Vitals:  Ht 160 cm (63\")   Wt 82.7 kg (182 lb 4.8 oz)   BMI 32.29 kg/m²     T 98.0 HR 56 RR 24 BP 91/42 SPO2 95%        I/O (24Hr):  No intake or output data in the 24 hours ending 07/04/21 0728    Labs and imaging:      No results found for this or any previous visit (from the past 12 hour(s)).                            Physical Examination:        Physical Exam  Vitals and nursing note reviewed.   Constitutional:       Appearance: Normal appearance. She is normal weight.   HENT:      Head: Normocephalic and atraumatic.      Right Ear: External ear normal.      Left Ear: External ear normal.      Nose: Nose normal.      Mouth/Throat:      Mouth: Mucous membranes are dry.      Pharynx: Oropharynx is clear.   Eyes:      Conjunctiva/sclera: Conjunctivae normal.      Pupils: Pupils are equal, round, and reactive to light.   Cardiovascular:      Rate and Rhythm: Normal rate and regular rhythm.      Pulses: Normal pulses.      Heart sounds: Normal " heart sounds.   Pulmonary:      Effort: Pulmonary effort is normal.      Breath sounds: Normal breath sounds.   Abdominal:      General: Abdomen is flat. Bowel sounds are normal.      Palpations: Abdomen is soft.   Musculoskeletal:         General: Normal range of motion.      Cervical back: Normal range of motion and neck supple.      Comments: Generalized weakness   Skin:     General: Skin is warm and dry.      Capillary Refill: Capillary refill takes less than 2 seconds.      Comments: Bruising to extremities and face    Neurological:      Mental Status: She is alert.   Psychiatric:         Mood and Affect: Mood normal.         Behavior: Behavior normal.           Assessment:        Primary Problem  <principal problem not specified>       * No active hospital problems. *    No past medical history on file.     Plan:        1. Acute on chronic hypoxic respiratory failure  2. S/p COVID-19  3. Multifactorial anemia  4. PAF  5. COPD  6. GERD  7. Hx CAD     Continue current treatment. Monitor counts. Increase activity. Labs Monday. Maintain patient safety. Oxygen weaning as tolerated. Continue gentle diuresis. Continue steroid weaning. Aggressive therapies as tolerated.       Electronically signed by GUILLERMO Coronado on 7/4/2021 at 07:28 CDT

## 2021-07-04 NOTE — PROGRESS NOTES
Mark Lovelace MD    Subjective:  LOS: 0    Chief Complaint:  Breathing difficulty    Remains on Vapotherm at 55%. No new complaints. Denies sig cough or phlegm.    Objective   Vital Signs past 24hrs    Temp range: 98.0  BP range: 91/42  Pulse range: 56  Resp rate range: 24  Oxygen range: 95%     82.7 kg (182 lb 4.8 oz); Body mass index is 32.29 kg/m².  No intake or output data in the 24 hours ending 07/04/21 1234    Physical Exam  Eyes:      Pupils: Pupils are equal, round, and reactive to light.   Cardiovascular:      Rate and Rhythm: Normal rate and regular rhythm.      Heart sounds: No murmur heard.     Pulmonary:      Breath sounds: Rales present.   Abdominal:      General: Bowel sounds are normal.      Palpations: Abdomen is soft. There is no mass.      Tenderness: There is no abdominal tenderness.   Musculoskeletal:         General: No swelling.   Neurological:      Mental Status: She is alert.       Results Review:    I have reviewed the laboratory and imaging data since the last note by LPC physician.  My annotations are noted in assessment and plan.    Medication Review:  I have reviewed the current MAR.  My annotations are noted in assessment and plan.       Plan   PCCM Problems  Acute respiratory failure  History of SARS-CoV-2 infection  COPD  Anemia  Relevant Medical Diagnoses  Recent C. difficile infection  A. fib  CAD      Plan of Treatment  Continue supplemental oxygen through Vapotherm and wean as tolerated.    CxR noted. Primarily BISI infiltrate.    She was treated for secondary bacterial infection after Covid and remains on vancomycin.    She is currently on a prednisone taper and is not wheezing.    She is on prophylactic dose anticoagulation.    Mild anemia appears stable.    Mark Lovelace MD  07/04/21  12:34 CDT    While in the room and during my examination of the patient I wore gloves, gown, mask, eye protection and followed enhanced droplet/contact isolation protocol and precautions.  I washed  my hands before and after this patient encounter.    Part of this note may be an electronic transcription/translation of spoken language to printed text using the Dragon Dictation System.

## 2021-07-05 NOTE — PROGRESS NOTES
Mark Lovelace MD    Subjective:  LOS: 0  Chief Complaint:  Breathing difficulty    Now on HF cannula and improving. Denies sig cough or phlegm.    Objective   Vital Signs past 24hrs    Temp range: 97.7  BP range: 92/51  Pulse range: 66  Resp rate range: 20  Oxygen range: 94%     82.7 kg (182 lb 4.8 oz); Body mass index is 32.29 kg/m².  No intake or output data in the 24 hours ending 07/05/21 1154    Physical Exam  Eyes:      Pupils: Pupils are equal, round, and reactive to light.   Cardiovascular:      Rate and Rhythm: Normal rate and regular rhythm.      Heart sounds: No murmur heard.     Pulmonary:      Breath sounds: Rales present.   Abdominal:      General: Bowel sounds are normal.      Palpations: Abdomen is soft. There is no mass.      Tenderness: There is no abdominal tenderness.   Musculoskeletal:         General: No swelling.   Neurological:      Mental Status: She is alert.       Results Review:    I have reviewed the laboratory and imaging data since the last note by LPC physician.  My annotations are noted in assessment and plan.    Medication Review:  I have reviewed the current MAR.  My annotations are noted in assessment and plan.       Plan   PCCM Problems  Acute respiratory failure  History of SARS-CoV-2 infection  COPD  Anemia  Relevant Medical Diagnoses  Recent C. difficile infection  A. fib  CAD      Plan of Treatment    Continue supplemental oxygen, now on HF cannula and improved.    CxR noted. Primarily BISI infiltrate. Can repeat imaging in 6-8 weeks for improvement.    She was treated for secondary bacterial infection after Covid and now off all abx.    She is currently on a prednisone taper and is not wheezing.    She is on prophylactic dose anticoagulation. Hx Afib, unclear if previously anticoagulated.    Mild anemia appears stable.    Mark Lovelace MD  07/05/21  11:54 CDT    While in the room and during my examination of the patient I wore gloves, gown, mask, eye protection and followed  enhanced droplet/contact isolation protocol and precautions.  I washed my hands before and after this patient encounter.    Part of this note may be an electronic transcription/translation of spoken language to printed text using the Dragon Dictation System.

## 2021-07-05 NOTE — PROGRESS NOTES
"[facility] Fall Assessment Note    Radha Varela is a 75 y.o.female  [Ht: 160 cm (63\"); Wt: 82.7 kg (182 lb 4.8 oz)] admitted 7/1/2021  7:50 PM.    Current medications associated with an increased risk for fall include:  Diltiazem  Duloxetine  Furosemide  Alprazolam  Morphine  Ondansetron    L Pola Cuellar, [facility]  07/05/2113:38 CDT         "

## 2021-07-05 NOTE — PROGRESS NOTES
[facility]  WILBER Douglas M.D.  GUILLERMO Melendez        Internal Medicine Progress Note    7/5/2021   09:48 CDT    Name:  Radha Varela  MRN:    [id number]     Acct:     [id number]   Room:  [address] [place] [address] Day: 0     Admit Date: 7/1/2021  7:50 PM  PCP: Eduardo Saba MD    Subjective:     C/C: Shortness of breath, weakness     Interval History: Status:  Stable. Resting in bed. No family at bedside. 02 sats stable with 02 at 6 lpm. Denies pain. Slow progress with therapies.     Review of Systems   Constitutional: Negative for chills, decreased appetite, malaise/fatigue, weight gain and weight loss.   HENT: Negative for congestion, ear discharge, ear pain, hoarse voice, nosebleeds and tinnitus.    Eyes: Negative for blurred vision, discharge, pain, visual disturbance and visual halos.   Cardiovascular: Positive for dyspnea on exertion. Negative for chest pain, claudication, irregular heartbeat, leg swelling, orthopnea and paroxysmal nocturnal dyspnea.   Respiratory: Positive for shortness of breath. Negative for cough, sputum production and wheezing.    Endocrine: Negative for cold intolerance, heat intolerance and polyuria.   Hematologic/Lymphatic: Negative for adenopathy. Does not bruise/bleed easily.   Skin: Negative for dry skin, itching, rash and suspicious lesions.   Musculoskeletal: Positive for muscle weakness. Negative for arthritis, back pain, falls, joint pain and myalgias.   Gastrointestinal: Negative for abdominal pain, constipation, diarrhea, dysphagia, hematemesis, nausea and vomiting.   Genitourinary: Negative for bladder incontinence, dysuria, flank pain, frequency and hematuria.   Neurological: Positive for weakness. Negative for aphonia, disturbances in coordination, dizziness, headaches, numbness and paresthesias.   Psychiatric/Behavioral: Negative for altered mental status, depression, memory loss and substance abuse. The patient does not have insomnia and is not nervous/anxious.     Allergic/Immunologic: Negative for environmental allergies, HIV exposure and hives.         Medications:     Allergies:   Allergies   Allergen Reactions   • Benadryl Allergy Childrens [Diphenhydramine Hcl] Unknown - Low Severity   • Celexa [Citalopram] Unknown - Low Severity   • Codeine Unknown - Low Severity   • Floxin Otic [Ofloxacin] Unknown - Low Severity   • Lortab [Hydrocodone-Acetaminophen] Unknown - Low Severity   • Sulfa Antibiotics Unknown - Low Severity       Current Meds:   Current Facility-Administered Medications:   •  acetaminophen (TYLENOL) tablet 650 mg, 650 mg, Oral, Q6H PRN, Florencio Douglas MD  •  ALPRAZolam (XANAX) tablet 0.25 mg, 0.25 mg, Oral, BID PRN, Florencio Douglas MD  •  aspirin chewable tablet 81 mg, 81 mg, Oral, Daily, Florencio Douglas MD  •  b complex-vitamin c-folic acid (NEPHRO-CORI) tablet 1 tablet, 1 tablet, Oral, Daily, Florencio Douglas MD  •  bisacodyl (DULCOLAX) suppository 10 mg, 10 mg, Rectal, Daily PRN, Florencio Douglas MD  •  dilTIAZem CD (CARDIZEM CD) 24 hr capsule 180 mg, 180 mg, Oral, Nightly, Florencio Douglas MD  •  DULoxetine (CYMBALTA) DR capsule 30 mg, 30 mg, Oral, Daily, Florencio Douglas MD  •  fondaparinux (ARIXTRA) injection 2.5 mg, 2.5 mg, Subcutaneous, Q24H, Florencio Douglas MD  •  furosemide (LASIX) injection 20 mg, 20 mg, Intravenous, BID, Florencio Douglas MD  •  guaiFENesin (MUCINEX) 12 hr tablet 600 mg, 600 mg, Oral, Q12H, Florencio Douglas MD  •  ipratropium-albuterol (DUO-NEB) nebulizer solution 3 mL, 3 mL, Nebulization, Q6H - RT, Florencio Douglas MD  •  Morphine sulfate (PF) injection 1 mg, 1 mg, Intravenous, Q4H PRN, Florencio Douglas MD  •  nystatin (MYCOSTATIN) 126184 UNIT/ML suspension 500,000 Units, 5 mL, Swish & Swallow, Q6H, Florencio Douglas MD  •  ondansetron (ZOFRAN) injection 4 mg, 4 mg, Intravenous, Q6H PRN, Florencio Douglas MD  •  pantoprazole (PROTONIX) EC  "tablet 20 mg, 20 mg, Oral, Q AM, Florencio Douglas MD  •  polyethylene glycol (MIRALAX) packet 17 g, 17 g, Oral, Daily, Florencio Douglas MD  •  potassium chloride (MICRO-K) CR capsule 10 mEq, 10 mEq, Oral, Daily, Florencio Douglas MD  •  [] predniSONE (DELTASONE) tablet 30 mg, 30 mg, Oral, Daily With Breakfast **FOLLOWED BY** [] predniSONE (DELTASONE) tablet 25 mg, 25 mg, Oral, Daily With Breakfast **FOLLOWED BY** [] predniSONE (DELTASONE) tablet 20 mg, 20 mg, Oral, Daily With Breakfast **FOLLOWED BY** predniSONE (DELTASONE) tablet 15 mg, 15 mg, Oral, Daily With Breakfast **FOLLOWED BY** [START ON 2021] predniSONE (DELTASONE) tablet 10 mg, 10 mg, Oral, Daily With Breakfast **FOLLOWED BY** [START ON 2021] predniSONE (DELTASONE) tablet 5 mg, 5 mg, Oral, Daily With Breakfast, Florencio Douglas MD  •  saccharomyces boulardii (FLORASTOR) capsule 250 mg, 250 mg, Oral, BID, Florencio Douglas MD  •  sodium chloride 0.9 % flush 10 mL, 10 mL, Intravenous, Q12H, Florencio Douglas MD  •  sodium chloride 0.9 % flush 10 mL, 10 mL, Intravenous, PRN, Florencio Douglas MD  •  vancomycin 1 g/250 mL 0.9% NS (vial-mate), 1,000 mg, Intravenous, Q18H, Florencio Douglas MD  •  vitamin E capsule 400 Units, 400 Units, Oral, Daily, Florencio Douglas MD    Data:     Code Status:    There are no questions and answers to display.       No family history on file.    Social History     Socioeconomic History   • Marital status:      Spouse name: Not on file   • Number of children: Not on file   • Years of education: Not on file   • Highest education level: Not on file       Vitals:  Ht 160 cm (63\")   Wt 82.7 kg (182 lb 4.8 oz)   BMI 32.29 kg/m²     T 97.7 HR 66 RR 20 BP 92/51 SPO2 94% (6 lpm)        I/O (24Hr):  No intake or output data in the 24 hours ending 21 0948    Labs and imaging:      No results found for this or any previous visit (from the past 12 " hour(s)).      Physical Examination:        Physical Exam  Vitals and nursing note reviewed.   Constitutional:       Appearance: Normal appearance. She is normal weight.   HENT:      Head: Normocephalic and atraumatic.      Right Ear: External ear normal.      Left Ear: External ear normal.      Nose: Nose normal.      Mouth/Throat:      Mouth: Mucous membranes are dry.      Pharynx: Oropharynx is clear.   Eyes:      Conjunctiva/sclera: Conjunctivae normal.      Pupils: Pupils are equal, round, and reactive to light.   Cardiovascular:      Rate and Rhythm: Normal rate and regular rhythm.      Pulses: Normal pulses.      Heart sounds: Normal heart sounds.   Pulmonary:      Effort: Pulmonary effort is normal.      Breath sounds: Decreased breath sounds present.   Abdominal:      General: Abdomen is flat. Bowel sounds are normal.      Palpations: Abdomen is soft.   Musculoskeletal:         General: Normal range of motion.      Cervical back: Normal range of motion and neck supple.      Comments: Generalized weakness   Skin:     General: Skin is warm and dry.      Capillary Refill: Capillary refill takes less than 2 seconds.      Comments: Bruising to extremities and face    Neurological:      Mental Status: She is alert.   Psychiatric:         Mood and Affect: Mood normal.         Behavior: Behavior normal.           Assessment:             * No active hospital problems. *    No past medical history on file.     Plan:        1. Acute on chronic hypoxic respiratory failure  2. S/p COVID-19  3. Multifactorial anemia  4. PAF  5. COPD  6. GERD  7. Hx CAD     Continue current treatment. Monitor counts. Increase activity. Labs today and Thursday. Maintain patient safety. Oxygen weaning as tolerated. Continue gentle diuresis. Continue steroid weaning. Aggressive therapies as tolerated. Repeat CXR today.       Electronically signed by GUILLERMO Nevarez on 7/5/2021 at 09:48 CDT     I have discussed the care of Radha ASIF  Avery, including pertinent history and exam findings, with the nurse practitioner.    I have seen and examined the patient and the key elements of all parts of the encounter have been performed by me.  I agree with the assessment, plan and orders as documented by GUILLERMO Melendez, after I modified the exam findings and the plan of treatments and the final version is my approved version of the assessment.        Electronically signed by Florencio Douglas MD on 7/5/2021 at 15:46 CDT

## 2021-07-06 NOTE — PROGRESS NOTES
NAUN Ham APRN        Internal Medicine Progress Note    7/6/2021   12:53 CDT    Name:  Radha Varela  MRN:    [id number]     Acct:     [id number]   Room:  [facility] Day: 0     Admit Date: 7/1/2021  7:50 PM  PCP: Eduardo Saba MD    Subjective:     C/C: Shortness of breath, weakness     Interval History: Status:  Stable. Up to chair. No family at bedside. 02 sats stable with 02 at 6 lpm. Denies pain. Slow progress with therapies. Overall, feeling much better with no new concerns.     Review of Systems   Constitutional: Negative for chills, decreased appetite, malaise/fatigue, weight gain and weight loss.   HENT: Negative for congestion, ear discharge, ear pain, hoarse voice, nosebleeds and tinnitus.    Eyes: Negative for blurred vision, discharge, pain, visual disturbance and visual halos.   Cardiovascular: Positive for dyspnea on exertion. Negative for chest pain, claudication, irregular heartbeat, leg swelling, orthopnea and paroxysmal nocturnal dyspnea.   Respiratory: Positive for shortness of breath. Negative for cough, sputum production and wheezing.    Endocrine: Negative for cold intolerance, heat intolerance and polyuria.   Hematologic/Lymphatic: Negative for adenopathy. Does not bruise/bleed easily.   Skin: Negative for dry skin, itching, rash and suspicious lesions.   Musculoskeletal: Positive for muscle weakness. Negative for arthritis, back pain, falls, joint pain and myalgias.   Gastrointestinal: Negative for abdominal pain, constipation, diarrhea, dysphagia, hematemesis, nausea and vomiting.   Genitourinary: Negative for bladder incontinence, dysuria, flank pain, frequency and hematuria.   Neurological: Positive for weakness. Negative for aphonia, disturbances in coordination, dizziness, headaches, numbness and paresthesias.   Psychiatric/Behavioral: Negative for altered mental status, depression, memory loss and substance abuse. The patient does not  have insomnia and is not nervous/anxious.    Allergic/Immunologic: Negative for environmental allergies, HIV exposure and hives.         Medications:     Allergies:   Allergies   Allergen Reactions   • Benadryl Allergy Childrens [Diphenhydramine Hcl] Unknown - Low Severity   • Celexa [Citalopram] Unknown - Low Severity   • Codeine Unknown - Low Severity   • Floxin Otic [Ofloxacin] Unknown - Low Severity   • Lortab [Hydrocodone-Acetaminophen] Unknown - Low Severity   • Sulfa Antibiotics Unknown - Low Severity       Current Meds:   Current Facility-Administered Medications:   •  acetaminophen (TYLENOL) tablet 650 mg, 650 mg, Oral, Q6H PRN, Florencio Douglas MD  •  ALPRAZolam (XANAX) tablet 0.25 mg, 0.25 mg, Oral, BID PRN, Florencio Douglas MD  •  aspirin chewable tablet 81 mg, 81 mg, Oral, Daily, Florencio Douglas MD  •  b complex-vitamin c-folic acid (NEPHRO-CORI) tablet 1 tablet, 1 tablet, Oral, Daily, Florencio Douglas MD  •  bacitracin ointment, , Topical, Daily, Florencio Douglas MD  •  bisacodyl (DULCOLAX) suppository 10 mg, 10 mg, Rectal, Daily PRN, Florencio Douglas MD  •  dilTIAZem CD (CARDIZEM CD) 24 hr capsule 180 mg, 180 mg, Oral, Nightly, Florencio Douglas MD  •  DULoxetine (CYMBALTA) DR capsule 30 mg, 30 mg, Oral, Daily, Florencio Douglas MD  •  fondaparinux (ARIXTRA) injection 2.5 mg, 2.5 mg, Subcutaneous, Q24H, Florencio Douglas MD  •  furosemide (LASIX) injection 20 mg, 20 mg, Intravenous, BID, Florencio Douglas MD  •  guaiFENesin (MUCINEX) 12 hr tablet 600 mg, 600 mg, Oral, Q12H, Florencio Douglas MD  •  ipratropium-albuterol (DUO-NEB) nebulizer solution 3 mL, 3 mL, Nebulization, Q6H - RT, Florencio Douglas MD  •  Morphine sulfate (PF) injection 1 mg, 1 mg, Intravenous, Q4H PRN, Florencio Douglas MD  •  nystatin (MYCOSTATIN) 482502 UNIT/ML suspension 500,000 Units, 5 mL, Swish & Swallow, Q6H, Florencio Douglas MD  •  ondansetron  "(ZOFRAN) injection 4 mg, 4 mg, Intravenous, Q6H PRN, Florencio Douglas MD  •  pantoprazole (PROTONIX) EC tablet 20 mg, 20 mg, Oral, Q AM, Florencio Douglas MD  •  polyethylene glycol (MIRALAX) packet 17 g, 17 g, Oral, Daily, Florencio Douglas MD  •  potassium chloride (MICRO-K) CR capsule 10 mEq, 10 mEq, Oral, Daily, Florencio Douglas MD  •  [] predniSONE (DELTASONE) tablet 30 mg, 30 mg, Oral, Daily With Breakfast **FOLLOWED BY** [] predniSONE (DELTASONE) tablet 25 mg, 25 mg, Oral, Daily With Breakfast **FOLLOWED BY** [] predniSONE (DELTASONE) tablet 20 mg, 20 mg, Oral, Daily With Breakfast **FOLLOWED BY** [] predniSONE (DELTASONE) tablet 15 mg, 15 mg, Oral, Daily With Breakfast **FOLLOWED BY** predniSONE (DELTASONE) tablet 10 mg, 10 mg, Oral, Daily With Breakfast **FOLLOWED BY** [START ON 2021] predniSONE (DELTASONE) tablet 5 mg, 5 mg, Oral, Daily With Breakfast, Florencio Douglas MD  •  saccharomyces boulardii (FLORASTOR) capsule 250 mg, 250 mg, Oral, BID, Florencio Douglas MD  •  sodium chloride 0.9 % flush 10 mL, 10 mL, Intravenous, Q12H, Florencio Douglas MD  •  sodium chloride 0.9 % flush 10 mL, 10 mL, Intravenous, PRN, Florencio Douglas MD  •  vitamin E capsule 400 Units, 400 Units, Oral, Daily, Florencio Douglas MD    Data:     Code Status:    There are no questions and answers to display.       No family history on file.    Social History     Socioeconomic History   • Marital status:      Spouse name: Not on file   • Number of children: Not on file   • Years of education: Not on file   • Highest education level: Not on file       Vitals:  Ht 160 cm (63\")   Wt 82.7 kg (182 lb 4.8 oz)   BMI 32.29 kg/m²     T 98.3 HR 65 RR 23 BP 96/45 SPO2 93% (6 lpm)        I/O (24Hr):  No intake or output data in the 24 hours ending 07/06/21 1545    Labs and imaging:      No results found for this or any previous visit (from the past 12 " hour(s)).      Physical Examination:        Physical Exam  Vitals and nursing note reviewed.   Constitutional:       Appearance: Normal appearance. She is normal weight.   HENT:      Head: Normocephalic and atraumatic.      Right Ear: External ear normal.      Left Ear: External ear normal.      Nose: Nose normal.      Mouth/Throat:      Mouth: Mucous membranes are dry.      Pharynx: Oropharynx is clear.   Eyes:      Conjunctiva/sclera: Conjunctivae normal.      Pupils: Pupils are equal, round, and reactive to light.   Cardiovascular:      Rate and Rhythm: Normal rate and regular rhythm.      Pulses: Normal pulses.      Heart sounds: Normal heart sounds.   Pulmonary:      Effort: Pulmonary effort is normal.      Breath sounds: Decreased breath sounds present.   Abdominal:      General: Abdomen is flat. Bowel sounds are normal.      Palpations: Abdomen is soft.   Musculoskeletal:         General: Normal range of motion.      Cervical back: Normal range of motion and neck supple.      Comments: Generalized weakness   Skin:     General: Skin is warm and dry.      Capillary Refill: Capillary refill takes less than 2 seconds.      Comments: Bruising to extremities and face    Neurological:      Mental Status: She is alert.   Psychiatric:         Mood and Affect: Mood normal.         Behavior: Behavior normal.           Assessment:             * No active hospital problems. *    No past medical history on file.     Plan:        1. Acute on chronic hypoxic respiratory failure  2. S/p COVID-19  3. Multifactorial anemia  4. PAF  5. COPD  6. GERD  7. Hx CAD     Continue current treatment. Monitor counts. Increase activity. Labs Thursday. Maintain patient safety. Oxygen weaning as tolerated. Continue gentle diuresis. Continue steroid weaning. Aggressive therapies as tolerated.       Electronically signed by GUILLERMO Nevarez on 7/6/2021 at 12:53 CDT     I have discussed the care of Radha Varela, including pertinent  history and exam findings, with the nurse practitioner.    I have seen and examined the patient and the key elements of all parts of the encounter have been performed by me.  I agree with the assessment, plan and orders as documented by GUILLERMO Melendez, after I modified the exam findings and the plan of treatments and the final version is my approved version of the assessment.        Electronically signed by Florencio Douglas MD on 7/6/2021 at 22:41 CDT

## 2021-07-06 NOTE — PROGRESS NOTES
Mark Lovelace MD    Subjective:  LOS: 0    Chief Complaint:  Breathing difficulty    She continues to improve. No cough or phlegm. Sitting in chair.    Objective   Vital Signs past 24hrs    Temp range: 98.3  BP range: 96/45  Pulse range: 65  Resp rate range: 23  Oxygen range: 93%     82.7 kg (182 lb 4.8 oz); Body mass index is 32.29 kg/m².  No intake or output data in the 24 hours ending 07/06/21 1055    Physical Exam  Eyes:      Pupils: Pupils are equal, round, and reactive to light.   Cardiovascular:      Rate and Rhythm: Normal rate and regular rhythm.      Heart sounds: No murmur heard.     Pulmonary:      Breath sounds: Rales present.   Abdominal:      General: Bowel sounds are normal.      Palpations: Abdomen is soft. There is no mass.      Tenderness: There is no abdominal tenderness.   Musculoskeletal:         General: No swelling.   Neurological:      Mental Status: She is alert.       Results Review:    I have reviewed the laboratory and imaging data since the last note by LPC physician.  My annotations are noted in assessment and plan.    Medication Review:  I have reviewed the current MAR.  My annotations are noted in assessment and plan.       Plan   PCCM Problems  Acute respiratory failure  History of SARS-CoV-2 infection  COPD  Anemia  Relevant Medical Diagnoses  Recent C. difficile infection  A. fib  CAD      Plan of Treatment    Continue supplemental oxygen, now on HF cannula and improved. Wean as tolerated.    CxR noted. Bilateral infiltrates more dense in BISI. Can repeat imaging in 6-8 weeks for improvement.    She was treated for secondary bacterial infection after Covid and now off all abx.    She is currently on a prednisone taper and is not wheezing.    She is on prophylactic dose anticoagulation. Hx Afib, unclear if previously anticoagulated.    Mild anemia appears stable.    Mark Lovelace MD  07/06/21  10:55 CDT    While in the room and during my examination of the patient I wore gloves, gown,  mask, eye protection and followed enhanced droplet/contact isolation protocol and precautions.  I washed my hands before and after this patient encounter.    Part of this note may be an electronic transcription/translation of spoken language to printed text using the Dragon Dictation System.

## 2021-07-07 LAB
QT INTERVAL: 338 MS
QT INTERVAL: 564 MS
QTC INTERVAL: 504 MS
QTC INTERVAL: 697 MS

## 2021-07-07 PROCEDURE — 25010000002 FONDAPARINUX PER 0.5 MG: Performed by: INTERNAL MEDICINE

## 2021-07-07 PROCEDURE — 25010000002 EPINEPHRINE 1 MG/10ML SOLUTION PREFILLED SYRINGE

## 2021-07-07 PROCEDURE — 25010000003 ATROPINE SULFATE

## 2021-07-07 NOTE — DISCHARGE SUMMARY
NAUN Ham APRN      Internal Medicine Death Summary    Patient ID: Radha Varela  MRN: [id number]     Acct:  [id number]       Patient's PCP: Eduardo Saba MD    Admit Date: 7/1/2021     Discharge Date: 7/6/2021      Admitting Physician: Florencio Douglas MD    Discharge Physician: GUILLERMO Nevarez     Final Diagnoses  1.   Acute on chronic hypoxic respiratory failure  2. S/p COVID-19  3. STEMI  4. Multifactorial anemia  5. PAF  6. COPD  7. GERD  Hx CAD  Hospital Problems    * No active hospital problems. *   No past medical history on file.      Code Status:    There are no questions and answers to display.       Hospital Course: Radha Varela is a  75 y.o.  female who presents with need for continued oxygen weaning and rehabilitation following recent acute care stay. The patient had been in her usual state of health when she developed increased shortness of breath with fever and chills as well as diarrhea. When her symptoms persisted over about 5 days, she presented to the ER for evaluation and treatment. On intake, she was noted to be positive for COVID-19 as well as C. Diff. The patient declined convalescent plasma, but completed treatment with Remdesivir and IV steroids. Unfortunately, she had a continued decline from a respiratory standpoint. Initially, she was maintaining adequate 02 sats with 02 at 2 lpm. She required 100% NRB and repeat imaging showed worsening of disease. ID was consulted at that point and the patient was treated with Iv antibiotics for possible superimposed bacterial infection. The patient was preparing for transfer to [facility] for continued oxygen weaning and rehabilitation efforts when she developed fever and increased oxygen demand. The patient declined intubation/mechanical ventilation and changed code status to DNR/DNI. Code status was later changed back to full code. The patient had some mild improvement from  respiratory standpoint and has tolerated heated hi flow oxygen. Due to her need for continued oxygen weaning and rehabilitation efforts, she transferred to our facility. She is maintaining adequate 02 sats and appears in no acute distress with 02 at 30 lpm/70%.   The patient responded well to treatment and tolerated oxygen weaning. She transitioned to regular hi flow oxygen and began progressing slowly with therapies.On 7/6, she was maintaining adequate 02 sats with 02 at 6 lpm per nasal cannula and was up to the chair talking to family and staff when she stated that she felt that she was having a panic attack. She had loss of consciousness with respiratory arrest and loss of pulse. High quality CPR was initiated as well as ACLS protocol. EKG was obtained and was consistent with STEMI. Aggressive resuscitative efforts continued for nearly an hour when time of death was noted at 1620.     Consults:    Dr. Bryant (pulmonology)      Electronically signed by GUILLERMO Nevarez on 7/7/2021 at 10:53 CDT